# Patient Record
Sex: FEMALE | Race: OTHER | HISPANIC OR LATINO | Employment: FULL TIME | URBAN - METROPOLITAN AREA
[De-identification: names, ages, dates, MRNs, and addresses within clinical notes are randomized per-mention and may not be internally consistent; named-entity substitution may affect disease eponyms.]

---

## 2017-04-03 ENCOUNTER — OFFICE VISIT (OUTPATIENT)
Dept: MEDICAL GROUP | Facility: MEDICAL CENTER | Age: 32
End: 2017-04-03
Payer: COMMERCIAL

## 2017-04-03 VITALS
HEART RATE: 69 BPM | SYSTOLIC BLOOD PRESSURE: 110 MMHG | WEIGHT: 133.16 LBS | BODY MASS INDEX: 24.5 KG/M2 | HEIGHT: 62 IN | OXYGEN SATURATION: 95 % | TEMPERATURE: 98.2 F | RESPIRATION RATE: 14 BRPM | DIASTOLIC BLOOD PRESSURE: 76 MMHG

## 2017-04-03 DIAGNOSIS — Z76.0 MEDICATION REFILL: ICD-10-CM

## 2017-04-03 DIAGNOSIS — J45.20 MILD INTERMITTENT ASTHMA WITHOUT COMPLICATION: ICD-10-CM

## 2017-04-03 DIAGNOSIS — J45.901 ASTHMA EXACERBATION: ICD-10-CM

## 2017-04-03 PROCEDURE — 99214 OFFICE O/P EST MOD 30 MIN: CPT | Performed by: PHYSICIAN ASSISTANT

## 2017-04-03 RX ORDER — MONTELUKAST SODIUM 10 MG/1
10 TABLET ORAL DAILY
Qty: 30 TAB | Refills: 5 | Status: SHIPPED | OUTPATIENT
Start: 2017-04-03 | End: 2017-09-24 | Stop reason: SDUPTHER

## 2017-04-03 RX ORDER — ALBUTEROL SULFATE 90 UG/1
2 AEROSOL, METERED RESPIRATORY (INHALATION) EVERY 6 HOURS PRN
Qty: 8.5 G | Refills: 5 | Status: SHIPPED | OUTPATIENT
Start: 2017-04-03 | End: 2017-09-11 | Stop reason: SDUPTHER

## 2017-04-03 ASSESSMENT — PATIENT HEALTH QUESTIONNAIRE - PHQ9: CLINICAL INTERPRETATION OF PHQ2 SCORE: 0

## 2017-04-03 NOTE — ASSESSMENT & PLAN NOTE
Chronic in nature. Patient states flaring up this time of year. Patient makes that she is not using on a regular basis, but over the last few days has had to use it more periodically. Patient admits positive nasal congestion, will watery eyes slightly, slight sneezing. Positive dry cough on episodic occasion. No fever or chills. No nausea or vomiting.  Patient states no history of intubation. Patient states has not had to be hospitalized secondary to asthma.

## 2017-04-03 NOTE — PROGRESS NOTES
Chief Complaint   Patient presents with   • Medication Refill       HISTORY OF PRESENT ILLNESS: Patient is a 31 y.o. female established patient who presents today to talk about asthma    Uncomplicated asthma  Chronic in nature. Patient states flaring up this time of year. Patient makes that she is not using on a regular basis, but over the last few days has had to use it more periodically. Patient admits positive nasal congestion, will watery eyes slightly, slight sneezing. Positive dry cough on episodic occasion. No fever or chills. No nausea or vomiting.  Patient states no history of intubation. Patient states has not had to be hospitalized secondary to asthma.       Patient Active Problem List    Diagnosis Date Noted   • Stuffy nose 09/15/2016   • Ear discomfort 09/15/2016   • Lower urinary tract infectious disease 09/28/2015   • Uncomplicated asthma 09/21/2015       Allergies:Review of patient's allergies indicates no known allergies.    Current Outpatient Prescriptions   Medication Sig Dispense Refill   • albuterol 108 (90 BASE) MCG/ACT Aero Soln inhalation aerosol Inhale 2 Puffs by mouth every 6 hours as needed for Shortness of Breath. 8.5 g 5   • montelukast (SINGULAIR) 10 MG Tab Take 1 Tab by mouth every day. 30 Tab 5     No current facility-administered medications for this visit.       Social History   Substance Use Topics   • Smoking status: Never Smoker    • Smokeless tobacco: Never Used   • Alcohol Use: 0.0 oz/week     0 Standard drinks or equivalent per week      Comment: occasional       No family status information on file.   No family history on file.    Review of Systems:   Constitutional: Negative for fever, chills, weight loss and malaise/fatigue.   HENT: Negative for ear pain, nosebleeds.  positive nasal congestion.  Negative sore throat and neck pain.    Eyes: Negative for blurred vision.   Respiratory: Positive for cough.  Negative sputum production, shortness of breath and wheezing.   "  Cardiovascular: Negative for chest pain, palpitations, orthopnea and leg swelling.   Gastrointestinal: Negative for heartburn, nausea, vomiting and abdominal pain.   Genitourinary: Negative for dysuria, urgency and frequency.   Musculoskeletal: Negative for myalgias, back pain and joint pain.   Skin: Negative for rash and itching.   Neurological: Negative for dizziness, tingling, tremors, sensory change, focal weakness and headaches.   Endo/Heme/Allergies: Does not bruise/bleed easily.   Psychiatric/Behavioral: Negative for depression, suicidal ideas and memory loss.  The patient is not nervous/anxious and does not have insomnia.    All other systems reviewed and are negative except as in HPI.    Exam:  Blood pressure 110/76, pulse 69, temperature 36.8 °C (98.2 °F), resp. rate 14, height 1.575 m (5' 2.01\"), weight 60.4 kg (133 lb 2.5 oz), SpO2 95 %, not currently breastfeeding.  General:  Well nourished, well developed female in NAD  Head: is grossly normal.  HEENT: Eyes conjunctiva is clear, lids without ptosis, pupils equal round and reactive to light and accommodation.  Ears normal shape and contour, canals are clear bilaterally, TMs with good light reflex and appear normal.  Nasal mucosa hypertrophic, boggy, and edematous with + rhinorrhea.  Oropharynx + PND.  Sinuses (frontal and maxillary) nontender to palpation.   Neck: Supple without JVD or bruit. Thyroid is not enlarged.  Pulmonary: Clear to ausculation. Normal effort. No rales, ronchi, or wheezing.  Cardiovascular: Regular rate and rhythm without murmur. Carotid and radial pulses are intact and equal bilaterally.  Extremities: no clubbing, cyanosis, or edema.    Medical decision-making and discussion: A 31-year-old female presents to clinic today to discuss chronic condition of asthma. Discussed the differential diagnosis. At such time or going to refill her albuterol. I do believe patient has a slight allergic rhinitis component as well and we will be " adding into the component Singulair 10 mg daily basis. Discussed with patient that by taking singular daily basis may be able to use rescue inhaler less often as it may not be needed. Also discussed over-the-counter Flonase which will help with nasal congestion.    Please note that this dictation was created using voice recognition software. I have made every reasonable attempt to correct obvious errors, but I expect that there are errors of grammar and possibly content that I did not discover before finalizing the note.    Assessment/Plan:  1. Mild intermittent asthma without complication  albuterol 108 (90 BASE) MCG/ACT Aero Soln inhalation aerosol    montelukast (SINGULAIR) 10 MG Tab   2. Asthma exacerbation  albuterol 108 (90 BASE) MCG/ACT Aero Soln inhalation aerosol   3. Medication refill

## 2017-09-11 ENCOUNTER — OFFICE VISIT (OUTPATIENT)
Dept: MEDICAL GROUP | Facility: PHYSICIAN GROUP | Age: 32
End: 2017-09-11
Payer: COMMERCIAL

## 2017-09-11 VITALS
HEIGHT: 63 IN | BODY MASS INDEX: 23.39 KG/M2 | OXYGEN SATURATION: 96 % | HEART RATE: 71 BPM | WEIGHT: 132 LBS | SYSTOLIC BLOOD PRESSURE: 108 MMHG | TEMPERATURE: 97.5 F | DIASTOLIC BLOOD PRESSURE: 70 MMHG

## 2017-09-11 DIAGNOSIS — J45.20 MILD INTERMITTENT ASTHMA WITHOUT COMPLICATION: ICD-10-CM

## 2017-09-11 DIAGNOSIS — J45.901 ASTHMA EXACERBATION: ICD-10-CM

## 2017-09-11 PROCEDURE — 99214 OFFICE O/P EST MOD 30 MIN: CPT | Performed by: FAMILY MEDICINE

## 2017-09-11 RX ORDER — ALBUTEROL SULFATE 90 UG/1
2 AEROSOL, METERED RESPIRATORY (INHALATION) EVERY 4 HOURS PRN
Qty: 1 INHALER | Refills: 2 | Status: SHIPPED | OUTPATIENT
Start: 2017-09-11

## 2017-09-11 RX ORDER — PREDNISONE 20 MG/1
40 TABLET ORAL DAILY
Qty: 10 TAB | Refills: 0 | Status: SHIPPED | OUTPATIENT
Start: 2017-09-11 | End: 2017-09-16

## 2017-09-11 ASSESSMENT — PAIN SCALES - GENERAL: PAINLEVEL: 5=MODERATE PAIN

## 2017-09-11 NOTE — PROGRESS NOTES
"Subjective:   Yuliana Hernández is a 32 y.o. female here today for Asthma exacerbation    History of present illness:     Patient has an established diagnosis of intermittent asthma and has not had an exacerbation in several months. Since she was started on Singulair, she has done really well. However since the last 2 days she started developing symptoms including wheezing, shortness of breath, runny nose. 2 days back she first woke up with swollen eyelids and itchy eyes. She took some Benadryl, the eye symptoms improved quite a bit. No sick contacts but her dog gave birth to 6 puppies. She is allergic to animal dander. She denies fever, chills, diarrhea, body aches.  She has been having some cough with mild clear phlegm.    Current medicines (including changes today)  Current Outpatient Prescriptions   Medication Sig Dispense Refill   • predniSONE (DELTASONE) 20 MG Tab Take 2 Tabs by mouth every day for 5 days. 10 Tab 0   • albuterol 108 (90 Base) MCG/ACT Aero Soln inhalation aerosol Inhale 2 Puffs by mouth every four hours as needed for Shortness of Breath. 1 Inhaler 2   • montelukast (SINGULAIR) 10 MG Tab Take 1 Tab by mouth every day. 30 Tab 5     No current facility-administered medications for this visit.      She  has no past medical history on file.    ROS   No chest pain, no shortness of breath, no abdominal pain         Objective:     Blood pressure 108/70, pulse 71, temperature 36.4 °C (97.5 °F), height 1.6 m (5' 3\"), weight 59.9 kg (132 lb), SpO2 96 %. Body mass index is 23.38 kg/m².     PHYSICAL EXAM     GEN: Alert and oriented,well appearing, no acute distress  SKIN: warm, dry to touch, no rashes or lesions in visible areas  PSYCH: mood and affect normal, judgement normal  EYES: Conjunctiva clear, lids normal,pupils equal round and reactive  ENMT: Normal external nose and ears,EACs normal appearing, TM pearly gray with normal light reflex bilaterally,nasal mucosa and turbinates normal appearing with " congestion, lips without lesions,good dentition,oropharynx clear  Neck : Trachea midline, no masses or swelling, no thyromegaly  LYMPHATIC : No cervical or supraclavicular lymphadenopathy  RESPIRATORY : Unlabored respiratory effort, no distress noted, few scattered wheezes, no rhonchi  CARDIOVASCULAR: RRR, S1 S2 normal, no murmurs   MUSCULOSKELETAL : Normal gait and stance, no obvious abnormalities   NEURO: No overt focal neurologic deficits,sensation intact        Assessment and Plan:   The following treatment plan was discussed    1. Asthma exacerbation  New problem  Prescribed Prednisone for 5 days.  Albuterol inhaler prn for wheezing every 2-4 for 24 -48 hours  Take Benadryl PO daily for 10 days  Return to clinic if symptoms worsen  - predniSONE (DELTASONE) 20 MG Tab; Take 2 Tabs by mouth every day for 5 days.  Dispense: 10 Tab; Refill: 0  - albuterol 108 (90 Base) MCG/ACT Aero Soln inhalation aerosol; Inhale 2 Puffs by mouth every four hours as needed for Shortness of Breath.  Dispense: 1 Inhaler; Refill: 2    2. Mild intermittent asthma without complication  New problem to me  - albuterol 108 (90 Base) MCG/ACT Aero Soln inhalation aerosol; Inhale 2 Puffs by mouth every four hours as needed for Shortness of Breath.  Dispense: 1 Inhaler; Refill: 2      Followup: Return if symptoms worsen or fail to improve.         Please note that this dictation was created using voice recognition software. I have made every reasonable attempt to correct obvious errors, but I expect that there are errors of grammar and possibly content that I did not discover before finalizing the note.

## 2017-09-19 ENCOUNTER — APPOINTMENT (OUTPATIENT)
Dept: RADIOLOGY | Facility: IMAGING CENTER | Age: 32
End: 2017-09-19
Attending: PHYSICIAN ASSISTANT
Payer: COMMERCIAL

## 2017-09-19 ENCOUNTER — OFFICE VISIT (OUTPATIENT)
Dept: URGENT CARE | Facility: PHYSICIAN GROUP | Age: 32
End: 2017-09-19
Payer: COMMERCIAL

## 2017-09-19 VITALS
SYSTOLIC BLOOD PRESSURE: 92 MMHG | DIASTOLIC BLOOD PRESSURE: 68 MMHG | HEART RATE: 101 BPM | BODY MASS INDEX: 23.21 KG/M2 | WEIGHT: 131 LBS | HEIGHT: 63 IN | OXYGEN SATURATION: 94 % | RESPIRATION RATE: 20 BRPM | TEMPERATURE: 98.3 F

## 2017-09-19 DIAGNOSIS — R05.9 COUGH: ICD-10-CM

## 2017-09-19 DIAGNOSIS — J20.9 ACUTE BRONCHITIS, UNSPECIFIED ORGANISM: ICD-10-CM

## 2017-09-19 PROCEDURE — 99214 OFFICE O/P EST MOD 30 MIN: CPT | Performed by: PHYSICIAN ASSISTANT

## 2017-09-19 PROCEDURE — 94640 AIRWAY INHALATION TREATMENT: CPT | Performed by: PHYSICIAN ASSISTANT

## 2017-09-19 PROCEDURE — 71020 DX-CHEST-2 VIEWS: CPT | Mod: TC | Performed by: PHYSICIAN ASSISTANT

## 2017-09-19 RX ORDER — DOXYCYCLINE HYCLATE 100 MG
100 TABLET ORAL 2 TIMES DAILY
Qty: 20 TAB | Refills: 0 | Status: ON HOLD | OUTPATIENT
Start: 2017-09-19 | End: 2020-03-26

## 2017-09-19 RX ORDER — ALBUTEROL SULFATE 2.5 MG/3ML
2.5 SOLUTION RESPIRATORY (INHALATION) ONCE
Status: COMPLETED | OUTPATIENT
Start: 2017-09-19 | End: 2017-09-19

## 2017-09-19 RX ORDER — METHYLPREDNISOLONE 4 MG/1
TABLET ORAL
Qty: 21 TAB | Refills: 0 | Status: ON HOLD | OUTPATIENT
Start: 2017-09-19 | End: 2020-03-26

## 2017-09-19 RX ADMIN — ALBUTEROL SULFATE 2.5 MG: 2.5 SOLUTION RESPIRATORY (INHALATION) at 17:45

## 2017-09-19 ASSESSMENT — PAIN SCALES - GENERAL: PAINLEVEL: 6=MODERATE PAIN

## 2017-09-19 NOTE — PROGRESS NOTES
Chief Complaint   Patient presents with   • Cough     x1 week. Cough, loss of voice, ear pain, sore throat, headache.       HISTORY OF PRESENT ILLNESS: Patient is a 32 y.o. female who presents today for 1 week of feeling unwell.   She feels that overall her symptoms are changing and not getting better.  She states her nose is a little bit better as far as congestion.  She feels like her left ear is consistently hurting and is popping.  Her throat is hurting really badly as well. She is not sure if she has had any fevers.  Last few nights she has felt hot and cold.  Her cough has been productive of mucus.     She has also been using her rescue inhaler more this week as well.  She did see her PCP earlier in the week and was put on 5 days of Prednisone however feels she has gotten worse overall    Patient Active Problem List    Diagnosis Date Noted   • Asthma exacerbation 09/11/2017   • Stuffy nose 09/15/2016   • Ear discomfort 09/15/2016   • Lower urinary tract infectious disease 09/28/2015   • Uncomplicated asthma 09/21/2015       Allergies:Review of patient's allergies indicates no known allergies.    Current Outpatient Prescriptions Ordered in [facility]   Medication Sig Dispense Refill   • albuterol 108 (90 Base) MCG/ACT Aero Soln inhalation aerosol Inhale 2 Puffs by mouth every four hours as needed for Shortness of Breath. 1 Inhaler 2   • montelukast (SINGULAIR) 10 MG Tab Take 1 Tab by mouth every day. 30 Tab 5     No current Epic-ordered facility-administered medications on file.        No past medical history on file.    Social History   Substance Use Topics   • Smoking status: Never Smoker   • Smokeless tobacco: Never Used   • Alcohol use 0.0 oz/week      Comment: occasional       No family status information on file.   History reviewed. No pertinent family history.    ROS:  Review of Systems   Constitutional: SEE HPI  HENT: CRISTY HPI  Eyes: Negative for blurred vision.   Respiratory: SEE HPI   Cardiovascular:  "Negative for chest pain, palpitations, orthopnea and leg swelling.   Gastrointestinal: Negative for heartburn, nausea, vomiting and abdominal pain.   All other systems reviewed and are negative.       Exam:  Blood pressure (!) 92/68, pulse (!) 101, temperature 36.8 °C (98.3 °F), resp. rate 20, height 1.6 m (5' 3\"), weight 59.4 kg (131 lb), last menstrual period 09/12/2017, SpO2 94 %, not currently breastfeeding.  General:  Well nourished, well developed female in NAD  Eyes: PERRLA, EOM within normal limits, no conjunctival injection, no scleral icterus, visual fields and acuity grossly intact.  Ears: Normal shape and symmetry, no tenderness, no discharge. External canals are without any significant edema or erythema. Tympanic membranes are without any inflammation, no effusion. Gross auditory acuity is intact  Nose: Symmetrical, sinuses without tenderness, no discharge.   Mouth: reasonable hygiene, no erythema exudates or tonsillar enlargement.  Neck: no masses, range of motion within normal limits, no tracheal deviation. No lymphadenopathy  Pulmonary: Normal respiratory effort, diffuse scattered expiratory wheezes without rales or rhonchi.   Cardiovascular: regular rate and rhythm without murmurs, rubs, or gallops.  Skin: No visible rashes or lesion. Warm, pink, dry.   Extremities: no clubbing, cyanosis, or edema.  Neuro: A&O x 3. Speech normal/clear.  Normal gait.     RAD    Impression       No acute cardiopulmonary process is identified.   Reading Provider Reading Date   Feng Colon M.D. Sep 19, 2017       Assessment/Plan:  1. Acute bronchitis, unspecified organism  doxycycline (VIBRAMYCIN) 100 MG Tab    MethylPREDNISolone (MEDROL DOSEPAK) 4 MG Tablet Therapy Pack   2. Cough  DX-CHEST-2 VIEWS    albuterol (PROVENTIL) 2.5mg/3ml nebulizer solution 2.5 mg         -RAD negative as above.   -albuterol neb helped symptoms and wheezing.   -tx coverage as above, patient worsening/tactile fevers at day 7-8  -work " note provided  -humidifier/steamy showers recommended for lung soothing.  Rest and fluids emphasized.     Supportive care, differential diagnoses, and indications for immediate follow-up discussed with patient.   Pathogenesis of diagnosis discussed including typical length and natural progression.   Instructed to return to clinic or nearest emergency department for any change in condition, further concerns, or worsening of symptoms.  Patient states understanding of the plan of care and discharge instructions.  Instructed to make an appointment, for follow up, with their primary care provider.      Jenny Teague P.A.-C.

## 2017-09-19 NOTE — LETTER
September 19, 2017         Patient: Yuliana Hernández   YOB: 1985   Date of Visit: 9/19/2017           To Whom it May Concern:    Yuliana Hernández was seen in my clinic on 9/19/2017. She is to be off tomorrow to recover please.     If you have any questions or concerns, please don't hesitate to call.        Sincerely,           Jenny Teague P.A.-C.  Electronically Signed

## 2017-09-24 DIAGNOSIS — J45.20 MILD INTERMITTENT ASTHMA WITHOUT COMPLICATION: ICD-10-CM

## 2017-09-25 NOTE — TELEPHONE ENCOUNTER
Was the patient seen in the last year in this department? Yes     Does patient have an active prescription for medications requested? Yes     Received Request Via: Pharmacy     Last office visit: 09/11/2017  Last labs: 06/14/2017

## 2017-09-26 RX ORDER — MONTELUKAST SODIUM 10 MG/1
TABLET ORAL
Qty: 90 TAB | Refills: 1 | Status: SHIPPED | OUTPATIENT
Start: 2017-09-26 | End: 2019-05-03

## 2018-03-15 ENCOUNTER — OFFICE VISIT (OUTPATIENT)
Dept: URGENT CARE | Facility: PHYSICIAN GROUP | Age: 33
End: 2018-03-15
Payer: COMMERCIAL

## 2018-03-15 VITALS
RESPIRATION RATE: 20 BRPM | DIASTOLIC BLOOD PRESSURE: 68 MMHG | TEMPERATURE: 98.2 F | BODY MASS INDEX: 23.39 KG/M2 | SYSTOLIC BLOOD PRESSURE: 100 MMHG | OXYGEN SATURATION: 96 % | HEIGHT: 63 IN | WEIGHT: 132 LBS | HEART RATE: 87 BPM

## 2018-03-15 DIAGNOSIS — J06.9 VIRAL URI: ICD-10-CM

## 2018-03-15 DIAGNOSIS — J45.901 MODERATE ASTHMA WITH EXACERBATION, UNSPECIFIED WHETHER PERSISTENT: Primary | ICD-10-CM

## 2018-03-15 PROCEDURE — 94640 AIRWAY INHALATION TREATMENT: CPT | Performed by: PHYSICIAN ASSISTANT

## 2018-03-15 PROCEDURE — 99214 OFFICE O/P EST MOD 30 MIN: CPT | Performed by: PHYSICIAN ASSISTANT

## 2018-03-15 PROCEDURE — 99999 PR NO CHARGE: CPT | Performed by: PHYSICIAN ASSISTANT

## 2018-03-15 PROCEDURE — 94760 N-INVAS EAR/PLS OXIMETRY 1: CPT | Performed by: PHYSICIAN ASSISTANT

## 2018-03-15 RX ORDER — PROMETHAZINE HYDROCHLORIDE AND CODEINE PHOSPHATE 6.25; 1 MG/5ML; MG/5ML
5 SYRUP ORAL
Qty: 120 ML | Refills: 0 | Status: SHIPPED | OUTPATIENT
Start: 2018-03-15 | End: 2018-03-22

## 2018-03-15 RX ORDER — METHYLPREDNISOLONE 4 MG/1
4 TABLET ORAL DAILY
Qty: 1 KIT | Refills: 0 | Status: ON HOLD | OUTPATIENT
Start: 2018-03-15 | End: 2020-03-26

## 2018-03-15 RX ORDER — IPRATROPIUM BROMIDE AND ALBUTEROL SULFATE 2.5; .5 MG/3ML; MG/3ML
3 SOLUTION RESPIRATORY (INHALATION) ONCE
Status: COMPLETED | OUTPATIENT
Start: 2018-03-15 | End: 2018-03-15

## 2018-03-15 RX ADMIN — IPRATROPIUM BROMIDE AND ALBUTEROL SULFATE 3 ML: 2.5; .5 SOLUTION RESPIRATORY (INHALATION) at 12:40

## 2018-03-15 ASSESSMENT — ENCOUNTER SYMPTOMS
COUGH: 1
CONSTITUTIONAL NEGATIVE: 1
EYES NEGATIVE: 1
SHORTNESS OF BREATH: 1
GASTROINTESTINAL NEGATIVE: 1
WHEEZING: 1

## 2018-03-15 NOTE — PROGRESS NOTES
Subjective:      Yuliana Hernández is a 32 y.o. female who presents with Cough (cough, chest congestion, difficulty breathing x4 days)            HPI  Chief Complaint   Patient presents with   • Cough     cough, chest congestion, difficulty breathing x4 days       HPI:  Yuliana Hernández is a 32 y.o. female who presents with cough and asthma exacerbation x 4 days.  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 Was noticing worsening chest congestion and tightness this morning. Has been using her albuterol inhaler most every hour. Also having runny nose and sinus congestion. No fever or chills. No thick or yellow colored mucous changes. Patient denies HA, chest pain, palpitations, fever, chills, or n/v/d.      History reviewed. No pertinent past medical history.    History reviewed. No pertinent surgical history.    History reviewed. No pertinent family history.  No pertinent family history.    Social History     Social History   • Marital status:      Spouse name: N/A   • Number of children: N/A   • Years of education: N/A     Occupational History   • Not on file.     Social History Main Topics   • Smoking status: Never Smoker   • Smokeless tobacco: Never Used   • Alcohol use 0.0 oz/week      Comment: occasional   • Drug use: No   • Sexual activity: Yes     Other Topics Concern   • Not on file     Social History Narrative   • No narrative on file         Current Outpatient Prescriptions:   •  montelukast, TAKE ONE TABLET BY MOUTH DAILY, 3/15/2018  •  albuterol, 2 Puff, Inhalation, Q4HRS PRN, 3/15/2018  •  doxycycline, 100 mg, Oral, BID  •  MethylPREDNISolone, As directed on the packaging label.    No Known Allergies      Review of Systems   Constitutional: Negative.    HENT: Positive for congestion.    Eyes: Negative.    Respiratory: Positive for cough, shortness of breath and wheezing.    Gastrointestinal: Negative.    Skin: Negative.    All other systems reviewed and are  "negative.         Objective:     /68   Pulse 87   Temp 36.8 °C (98.2 °F)   Resp 20   Ht 1.6 m (5' 3\")   Wt 59.9 kg (132 lb)   SpO2 94%   BMI 23.38 kg/m²      Physical Exam       Nursing note and vitals reviewed.    Constitutional:   Appropriately groomed, pleasant affect, well nourished, in NAD.    Head:   Normocephalic, atraumatic.    Eyes:   PERRLA, EOM's full, sclera white, conjunctiva not erythematous, and medial canthus without exudate bilaterally.    Ears:  Auricle and tragus non-tender to manipulation.  No pre-auricular lymphadenopathy or mastoid ttp.  EACs with mild cerumen bilaterally, not erythematous.  TM’s pearly gray with cone of light present and umbo and malleolus visible bilaterally.  No bulging or fluid bubbles present in middle ear.  Hearing grossly intact to voice.    Nose:  Nares not patent bilaterally.  Nasal mucosa edematous with white rhinorrhea bilaterally.  Mild sinus tenderness to percussion.    Throat:  Dentition wnl, mucosa moist without lesions.  Oropharynx mildly erythematous, with no enlargement of the palatine tonsils bilaterally with no exudates.    Post nasal drainage  present.  Soft palate rises symmetrically bilaterally and uvula midline.      Neck: Neck supple, with mild anterior lymphadenopathy that is soft and mobile to palpation. Thyroid non-palpable without tenderness or nodules. No supraclavicular lymphadenopathy.    Lungs:  Respiratory effort not labored without accessory muscle use.  Lungs with inspiratory wheezes to auscultation. No rales. Rhonchi cleared with cough.    Heart:  RRR, without murmurs rubs or gallops.  Radial and dorsalis pedis pulse 2+ bilaterally.  No LE edema.    Musculoskeletal:  Gait non-antalgic with a narrow base.    Derm:  Skin without rashes or lesions with good turgor pressure.      Psychiatric:  Mood, affect, and judgement appropriate.       Assessment/Plan:     1. Moderate asthma with exacerbation, unspecified whether " persistent  Patient presents with 4 days of a viral URI causing asthma exacerbation. On exam patient has an oxygen sat of 94% room air. Lungs with inspiratory wheezes to auscultation. Post DuoNeb patient's oxygen saturation improved to 96% room air, adequate. Lungs sounds greatly improved with resolution of wheezes. Plan to treat with Medrol Dosepak and advise continue with albuterol inhaler. Patient does not require refill at this time. Cough medicine for bedtime use only. Work note provided.    Narcotic use report obtained with no indication of narcotic overuse or misuse and deemed necessary for treatment. Sedation, dependence, and constipation precautions given. Avoid use while driving or operating heavy machinery.      - ipratropium-albuterol (DUONEB) nebulizer solution 3 mL; 3 mL by Nebulization route Once.  - MethylPREDNISolone (MEDROL DOSEPAK) 4 MG Tablet Therapy Pack; Take 1 Tab by mouth every day. Take according to box instructions  Dispense: 1 Kit; Refill: 0  - promethazine-codeine (PHENERGAN-CODEINE) 6.25-10 MG/5ML Syrup; Take 5 mL by mouth at bedtime as needed for Cough for up to 7 days.  Dispense: 120 mL; Refill: 0    2. Viral URI  Recommended symptom support measures. Suspect viral URI at this time antibiotics are indicated. Reviewed signs and symptoms of bacterial infection and when to return to clinic.    Patient was in agreement with this treatment plan and seemed to understand without barriers. All questions were encouraged and answered.  Reviewed signs and symptoms of when to seek emergency medical care.     Please note that this dictation was created using voice recognition software.  I have made every reasonable attempt to correct obvious errors, but I expect there are errors of ihsan and possibly content that I did not discover before finalizing the note.

## 2018-03-15 NOTE — LETTER
March 15, 2018         Patient: Yuliana Hernández   YOB: 1985   Date of Visit: 3/15/2018           To Whom it May Concern:    Yuliana Hernández was seen in my clinic on 3/15/2018. Please excuse from work 3/15-3/16.    If you have any questions or concerns, please don't hesitate to call.        Sincerely,           Pablito Alcantar P.A.-C.  Electronically Signed

## 2019-05-03 ENCOUNTER — OFFICE VISIT (OUTPATIENT)
Dept: URGENT CARE | Facility: PHYSICIAN GROUP | Age: 34
End: 2019-05-03
Payer: COMMERCIAL

## 2019-05-03 VITALS
WEIGHT: 128 LBS | RESPIRATION RATE: 12 BRPM | HEIGHT: 63 IN | DIASTOLIC BLOOD PRESSURE: 68 MMHG | SYSTOLIC BLOOD PRESSURE: 98 MMHG | BODY MASS INDEX: 22.68 KG/M2 | TEMPERATURE: 98.1 F | OXYGEN SATURATION: 97 % | HEART RATE: 86 BPM

## 2019-05-03 DIAGNOSIS — J30.2 SEASONAL ALLERGIC RHINITIS, UNSPECIFIED TRIGGER: ICD-10-CM

## 2019-05-03 DIAGNOSIS — J45.21 MILD INTERMITTENT ASTHMA WITH ACUTE EXACERBATION: ICD-10-CM

## 2019-05-03 PROCEDURE — 99214 OFFICE O/P EST MOD 30 MIN: CPT | Performed by: FAMILY MEDICINE

## 2019-05-03 RX ORDER — ALBUTEROL SULFATE 90 UG/1
2 AEROSOL, METERED RESPIRATORY (INHALATION) EVERY 6 HOURS PRN
Qty: 8.5 G | Refills: 0 | Status: SHIPPED | OUTPATIENT
Start: 2019-05-03

## 2019-05-03 RX ORDER — MONTELUKAST SODIUM 10 MG/1
10 TABLET ORAL DAILY
Qty: 30 TAB | Refills: 2 | Status: ON HOLD
Start: 2019-05-03 | End: 2020-03-26

## 2019-05-03 RX ORDER — TRIAMCINOLONE ACETONIDE 55 UG/1
1 SPRAY, METERED NASAL DAILY
Qty: 17 G | Refills: 2 | Status: ON HOLD
Start: 2019-05-03 | End: 2020-03-26

## 2019-05-03 RX ORDER — AZITHROMYCIN 250 MG/1
TABLET, FILM COATED ORAL
Qty: 6 TAB | Refills: 0 | Status: ON HOLD | OUTPATIENT
Start: 2019-05-03 | End: 2020-03-26

## 2019-05-03 RX ORDER — CETIRIZINE HYDROCHLORIDE 10 MG/1
10 TABLET ORAL DAILY
Qty: 30 TAB | Refills: 2 | Status: ON HOLD
Start: 2019-05-03 | End: 2020-03-26

## 2019-05-03 ASSESSMENT — ENCOUNTER SYMPTOMS
HEADACHES: 1
SINUS PRESSURE: 0
CHILLS: 0

## 2019-05-03 NOTE — PROGRESS NOTES
"Subjective:   Yuliana Ridley a 33 y.o. female who presents for Sinus Problem (sinus congestion needs refill on albuterol and needs rx for singular )    Sinus Problem   This is a new problem. The current episode started in the past 7 days. The problem has been rapidly worsening since onset. The pain is moderate. Associated symptoms include congestion and headaches. Pertinent negatives include no chills, sinus pressure or sneezing.     Review of Systems   Constitutional: Negative for chills.   HENT: Positive for congestion. Negative for sinus pressure and sneezing.    Neurological: Positive for headaches.     No Known Allergies   Objective:   BP (!) 98/68 (BP Location: Right arm, Patient Position: Sitting, BP Cuff Size: Adult)   Pulse 86   Temp 36.7 °C (98.1 °F) (Temporal)   Resp 12   Ht 1.6 m (5' 3\")   Wt 58.1 kg (128 lb)   LMP 04/12/2019 (Within Days)   SpO2 97%   BMI 22.67 kg/m²   Physical Exam   Constitutional: She is oriented to person, place, and time. She appears well-developed and well-nourished. No distress.   HENT:   Head: Normocephalic and atraumatic.   Nose: Mucosal edema and rhinorrhea present. No sinus tenderness. Right sinus exhibits no maxillary sinus tenderness and no frontal sinus tenderness. Left sinus exhibits no maxillary sinus tenderness and no frontal sinus tenderness.   Eyes: Pupils are equal, round, and reactive to light. Conjunctivae and EOM are normal.   Cardiovascular: Normal rate and regular rhythm.    No murmur heard.  Pulmonary/Chest: Effort normal. No respiratory distress. She has wheezes. She has no rales.   Abdominal: Soft. She exhibits no distension. There is no tenderness.   Neurological: She is alert and oriented to person, place, and time. She has normal reflexes. No sensory deficit.   Skin: Skin is warm and dry.   Psychiatric: She has a normal mood and affect.     Assessment/Plan:   Assessment    1. Seasonal allergic rhinitis, unspecified trigger  - montelukast " (SINGULAIR) 10 MG Tab; Take 1 Tab by mouth every day.  Dispense: 30 Tab; Refill: 2  - triamcinolone (NASACORT) 55 MCG/ACT nasal inhaler; Spray 1 Spray in nose every day.  Dispense: 17 g; Refill: 2  - cetirizine (ZYRTEC) 10 MG Tab; Take 1 Tab by mouth every day.  Dispense: 30 Tab; Refill: 2    2. Mild intermittent asthma with acute exacerbation  - albuterol 108 (90 Base) MCG/ACT Aero Soln inhalation aerosol; Inhale 2 Puffs by mouth every 6 hours as needed for Shortness of Breath.  Dispense: 8.5 g; Refill: 0  - azithromycin (ZITHROMAX) 250 MG Tab; Take 2 tablets by mouth on day one. Take one tablet by mouth the remaining days until gone  Dispense: 6 Tab; Refill: 0      Differential diagnosis, natural history, supportive care, and indications for immediate follow-up discussed.  Return if symptoms worsen or fail to improve.

## 2019-05-03 NOTE — PATIENT INSTRUCTIONS
Allergic Rhinitis  Allergic rhinitis is when the mucous membranes in the nose respond to allergens. Allergens are particles in the air that cause your body to have an allergic reaction. This causes you to release allergic antibodies. Through a chain of events, these eventually cause you to release histamine into the blood stream. Although meant to protect the body, it is this release of histamine that causes your discomfort, such as frequent sneezing, congestion, and an itchy, runny nose.  What are the causes?  Seasonal allergic rhinitis (hay fever) is caused by pollen allergens that may come from grasses, trees, and weeds. Year-round allergic rhinitis (perennial allergic rhinitis) is caused by allergens such as house dust mites, pet dander, and mold spores.  What are the signs or symptoms?  · Nasal stuffiness (congestion).  · Itchy, runny nose with sneezing and tearing of the eyes.  How is this diagnosed?  Your health care provider can help you determine the allergen or allergens that trigger your symptoms. If you and your health care provider are unable to determine the allergen, skin or blood testing may be used. Your health care provider will diagnose your condition after taking your health history and performing a physical exam. Your health care provider may assess you for other related conditions, such as asthma, pink eye, or an ear infection.  How is this treated?  Allergic rhinitis does not have a cure, but it can be controlled by:  · Medicines that block allergy symptoms. These may include allergy shots, nasal sprays, and oral antihistamines.  · Avoiding the allergen.  Hay fever may often be treated with antihistamines in pill or nasal spray forms. Antihistamines block the effects of histamine. There are over-the-counter medicines that may help with nasal congestion and swelling around the eyes. Check with your health care provider before taking or giving this medicine.  If avoiding the allergen or the  medicine prescribed do not work, there are many new medicines your health care provider can prescribe. Stronger medicine may be used if initial measures are ineffective. Desensitizing injections can be used if medicine and avoidance does not work. Desensitization is when a patient is given ongoing shots until the body becomes less sensitive to the allergen. Make sure you follow up with your health care provider if problems continue.  Follow these instructions at home:  It is not possible to completely avoid allergens, but you can reduce your symptoms by taking steps to limit your exposure to them. It helps to know exactly what you are allergic to so that you can avoid your specific triggers.  Contact a health care provider if:  · You have a fever.  · You develop a cough that does not stop easily (persistent).  · You have shortness of breath.  · You start wheezing.  · Symptoms interfere with normal daily activities.  This information is not intended to replace advice given to you by your health care provider. Make sure you discuss any questions you have with your health care provider.  Document Released: 09/12/2002 Document Revised: 08/18/2017 Document Reviewed: 08/25/2014  Elsevier Interactive Patient Education © 2017 [employer]vier Inc[employer]

## 2019-12-02 LAB
HBV SURFACE AG SERPL QL IA: NORMAL
RUBV IGG SERPL IA-ACNC: NORMAL
TREPONEMA PALLIDUM IGG+IGM AB [PRESENCE] IN SERUM OR PLASMA BY IMMUNOASSAY: NORMAL

## 2020-03-17 ENCOUNTER — APPOINTMENT (OUTPATIENT)
Dept: RADIOLOGY | Facility: MEDICAL CENTER | Age: 35
End: 2020-03-17
Attending: OBSTETRICS & GYNECOLOGY
Payer: COMMERCIAL

## 2020-03-17 ENCOUNTER — HOSPITAL ENCOUNTER (OUTPATIENT)
Facility: MEDICAL CENTER | Age: 35
End: 2020-03-17
Attending: OBSTETRICS & GYNECOLOGY | Admitting: OBSTETRICS & GYNECOLOGY
Payer: COMMERCIAL

## 2020-03-17 VITALS
BODY MASS INDEX: 26.58 KG/M2 | RESPIRATION RATE: 12 BRPM | DIASTOLIC BLOOD PRESSURE: 67 MMHG | SYSTOLIC BLOOD PRESSURE: 112 MMHG | HEART RATE: 89 BPM | WEIGHT: 150 LBS | OXYGEN SATURATION: 98 % | HEIGHT: 63 IN | TEMPERATURE: 98 F

## 2020-03-17 LAB — CRYSTALS AMN MICRO: NORMAL

## 2020-03-17 PROCEDURE — 59025 FETAL NON-STRESS TEST: CPT

## 2020-03-17 PROCEDURE — 76816 OB US FOLLOW-UP PER FETUS: CPT

## 2020-03-17 PROCEDURE — 89060 EXAM SYNOVIAL FLUID CRYSTALS: CPT

## 2020-03-18 NOTE — PROGRESS NOTES
MARGUERITE 2020  GA 32w6d SIUP    Pt arrived to L&D c/o SROM around 1500 today. She had to change her underwear a few times. Clear fluid. Reports intermittent UCs more so when walking. Denies VB and reports +FM. pregnancy is complicated by placenta previa   TOCO/EFM applied, sterile speculum done, no pooling noted.  NILAM sent to lab     -order by Dr. Morrissey for OB US. No previa, baby vertex. SORAYA WNL   - Dr. Morrissey notified of US results. We come to bedside. SVE Closed. Per Dr. Morrissey ok to discharge home. Pre-term labor precautions discussed. Written understanding obtained. Pt and FOB ambulatory off unit in stable condition.

## 2020-03-18 NOTE — H&P
"OBSERVATION HISTORY AND PHYSICAL    IDENTIFICATION:  This is a 34-year-old  3, para 2-0-0-2, with an EGA of   32 and 6/7 weeks who presents with the chief complaint of vaginal discharge.    \"I think I broke my water.\"    HISTORY OF PRESENT ILLNESS:  This is a patient of mine who has gotten good   prenatal care.  Prenatal care has been uncomplicated.  She was seen in the   office today for normal OB care, went home and then at approximately 3 p.m.,   she had vaginal discharge; thought maybe she broke her water.  She   subsequently came here to labor and delivery for evaluation.  Here in labor   and delivery, fetal heart tracings reactive, category 1.  Fern is negative.    Her ultrasound shows a vertex presentation, size appropriate for dates, no   previa and adequate amniotic fluid.  Urinalysis is also normal.  She has no   other complaints of nausea, vomiting, fever, chills, change in bowel or   bladder habits.  Admits to good fetal movement.  Denies symptoms of PIH,   headaches, visual changes or epigastric pain.  She does have followup visit   scheduled in the office.    OBSTETRIC HISTORY:  Significant for 2 previous vaginal deliveries.    GYNECOLOGIC HISTORY:  History of abnormal Pap.  Most recent Pap is normal.    MEDICAL HISTORY:  ALLERGIES:  None.    CURRENT MEDICATIONS:  Prenatal vitamins.    MEDICAL PROBLEMS:  Asthma.    SURGICAL HISTORY:  None.    SOCIAL HISTORY:  Denies alcohol, tobacco or drug use.    FAMILY HISTORY:  Noncontributory.    REVIEW OF SYSTEMS:  Review of systems x12 is negative per AMA standards   available in chart.    LABORATORY DATA:  Normal.      PHYSICAL EXAMINATION:  ABDOMEN:  Soft, gravid.  EXTREMITIES:  Negative.    ASSESSMENT:  At this time:  1.  Pregnancy at 32 and 6/7 weeks.  2.  Spontaneous rupture of membranes ruled out.    PLAN:  At this time:  1.  Discharge to home.  2.  Follow up as planned.  3.  Kick counts,  labor precautions, PPROM precautions given.       " ____________________________________     MD CORONA Johnson / DAYNA    DD:  03/17/2020 21:59:57  DT:  03/17/2020 23:11:43    D#:  [id number]  Job#:  [id number]

## 2020-03-26 ENCOUNTER — HOSPITAL ENCOUNTER (INPATIENT)
Facility: MEDICAL CENTER | Age: 35
LOS: 18 days | End: 2020-04-14
Attending: OBSTETRICS & GYNECOLOGY | Admitting: OBSTETRICS & GYNECOLOGY
Payer: COMMERCIAL

## 2020-03-26 DIAGNOSIS — Z98.891 S/P CESAREAN SECTION: ICD-10-CM

## 2020-03-26 LAB
ABO + RH BLD: NORMAL
ABO GROUP BLD: NORMAL
ALBUMIN SERPL BCP-MCNC: 3.3 G/DL (ref 3.2–4.9)
ALBUMIN/GLOB SERPL: 1.1 G/DL
ALP SERPL-CCNC: 112 U/L (ref 30–99)
ALT SERPL-CCNC: 14 U/L (ref 2–50)
ANION GAP SERPL CALC-SCNC: 11 MMOL/L (ref 7–16)
APTT PPP: 25.4 SEC (ref 24.7–36)
AST SERPL-CCNC: 13 U/L (ref 12–45)
BILIRUB SERPL-MCNC: 0.2 MG/DL (ref 0.1–1.5)
BLD GP AB SCN SERPL QL: NORMAL
BUN SERPL-MCNC: 8 MG/DL (ref 8–22)
CALCIUM SERPL-MCNC: 8.1 MG/DL (ref 8.5–10.5)
CHLORIDE SERPL-SCNC: 106 MMOL/L (ref 96–112)
CO2 SERPL-SCNC: 19 MMOL/L (ref 20–33)
CREAT SERPL-MCNC: 0.62 MG/DL (ref 0.5–1.4)
ERYTHROCYTE [DISTWIDTH] IN BLOOD BY AUTOMATED COUNT: 48.2 FL (ref 35.9–50)
FIBRINOGEN PPP-MCNC: 505 MG/DL (ref 215–460)
GLOBULIN SER CALC-MCNC: 3.1 G/DL (ref 1.9–3.5)
GLUCOSE SERPL-MCNC: 92 MG/DL (ref 65–99)
HCT VFR BLD AUTO: 33.1 % (ref 37–47)
HGB BLD-MCNC: 10.9 G/DL (ref 12–16)
INR PPP: 0.92 (ref 0.87–1.13)
MAGNESIUM SERPL-MCNC: 4.3 MG/DL (ref 1.5–2.5)
MCH RBC QN AUTO: 29.8 PG (ref 27–33)
MCHC RBC AUTO-ENTMCNC: 32.9 G/DL (ref 33.6–35)
MCV RBC AUTO: 90.4 FL (ref 81.4–97.8)
PLATELET # BLD AUTO: 294 K/UL (ref 164–446)
PMV BLD AUTO: 10.2 FL (ref 9–12.9)
POTASSIUM SERPL-SCNC: 3.9 MMOL/L (ref 3.6–5.5)
PROT SERPL-MCNC: 6.4 G/DL (ref 6–8.2)
PROTHROMBIN TIME: 12.6 SEC (ref 12–14.6)
RBC # BLD AUTO: 3.66 M/UL (ref 4.2–5.4)
RH BLD: NORMAL
SODIUM SERPL-SCNC: 136 MMOL/L (ref 135–145)
WBC # BLD AUTO: 10.6 K/UL (ref 4.8–10.8)

## 2020-03-26 PROCEDURE — 36415 COLL VENOUS BLD VENIPUNCTURE: CPT

## 2020-03-26 PROCEDURE — 96366 THER/PROPH/DIAG IV INF ADDON: CPT

## 2020-03-26 PROCEDURE — 302128 INFUSION PUMP: Performed by: OBSTETRICS & GYNECOLOGY

## 2020-03-26 PROCEDURE — 85027 COMPLETE CBC AUTOMATED: CPT

## 2020-03-26 PROCEDURE — 80053 COMPREHEN METABOLIC PANEL: CPT

## 2020-03-26 PROCEDURE — 85610 PROTHROMBIN TIME: CPT

## 2020-03-26 PROCEDURE — 83735 ASSAY OF MAGNESIUM: CPT

## 2020-03-26 PROCEDURE — 85730 THROMBOPLASTIN TIME PARTIAL: CPT

## 2020-03-26 PROCEDURE — 86900 BLOOD TYPING SEROLOGIC ABO: CPT

## 2020-03-26 PROCEDURE — 86901 BLOOD TYPING SEROLOGIC RH(D): CPT

## 2020-03-26 PROCEDURE — 85384 FIBRINOGEN ACTIVITY: CPT

## 2020-03-26 PROCEDURE — 700105 HCHG RX REV CODE 258

## 2020-03-26 PROCEDURE — 96372 THER/PROPH/DIAG INJ SC/IM: CPT

## 2020-03-26 PROCEDURE — 96367 TX/PROPH/DG ADDL SEQ IV INF: CPT

## 2020-03-26 PROCEDURE — 700111 HCHG RX REV CODE 636 W/ 250 OVERRIDE (IP): Performed by: OBSTETRICS & GYNECOLOGY

## 2020-03-26 PROCEDURE — 87081 CULTURE SCREEN ONLY: CPT

## 2020-03-26 PROCEDURE — 87150 DNA/RNA AMPLIFIED PROBE: CPT

## 2020-03-26 PROCEDURE — 86850 RBC ANTIBODY SCREEN: CPT

## 2020-03-26 PROCEDURE — 96365 THER/PROPH/DIAG IV INF INIT: CPT

## 2020-03-26 RX ORDER — BETAMETHASONE SODIUM PHOSPHATE AND BETAMETHASONE ACETATE 3; 3 MG/ML; MG/ML
12 INJECTION, SUSPENSION INTRA-ARTICULAR; INTRALESIONAL; INTRAMUSCULAR; SOFT TISSUE EVERY 24 HOURS
Status: COMPLETED | OUTPATIENT
Start: 2020-03-26 | End: 2020-03-27

## 2020-03-26 RX ORDER — AMOXICILLIN 250 MG
1 CAPSULE ORAL 2 TIMES DAILY
Status: DISCONTINUED | OUTPATIENT
Start: 2020-03-26 | End: 2020-03-29

## 2020-03-26 RX ORDER — ONDANSETRON 2 MG/ML
4 INJECTION INTRAMUSCULAR; INTRAVENOUS EVERY 6 HOURS PRN
Status: DISCONTINUED | OUTPATIENT
Start: 2020-03-26 | End: 2020-04-10 | Stop reason: HOSPADM

## 2020-03-26 RX ORDER — MAGNESIUM SULFATE HEPTAHYDRATE 40 MG/ML
2 INJECTION, SOLUTION INTRAVENOUS CONTINUOUS
Status: DISCONTINUED | OUTPATIENT
Start: 2020-03-26 | End: 2020-03-28 | Stop reason: ALTCHOICE

## 2020-03-26 RX ORDER — SODIUM CHLORIDE, SODIUM LACTATE, POTASSIUM CHLORIDE, CALCIUM CHLORIDE 600; 310; 30; 20 MG/100ML; MG/100ML; MG/100ML; MG/100ML
INJECTION, SOLUTION INTRAVENOUS
Status: COMPLETED
Start: 2020-03-26 | End: 2020-03-26

## 2020-03-26 RX ORDER — MAGNESIUM SULFATE HEPTAHYDRATE 40 MG/ML
4 INJECTION, SOLUTION INTRAVENOUS ONCE
Status: COMPLETED | OUTPATIENT
Start: 2020-03-26 | End: 2020-03-26

## 2020-03-26 RX ORDER — DOCUSATE SODIUM 100 MG/1
100 CAPSULE, LIQUID FILLED ORAL 2 TIMES DAILY
Status: DISCONTINUED | OUTPATIENT
Start: 2020-03-26 | End: 2020-04-02

## 2020-03-26 RX ORDER — VITAMIN A ACETATE, BETA CAROTENE, ASCORBIC ACID, CHOLECALCIFEROL, .ALPHA.-TOCOPHEROL ACETATE, DL-, THIAMINE MONONITRATE, RIBOFLAVIN, NIACINAMIDE, PYRIDOXINE HYDROCHLORIDE, FOLIC ACID, CYANOCOBALAMIN, CALCIUM CARBONATE, FERROUS FUMARATE, ZINC OXIDE, CUPRIC OXIDE 3080; 12; 120; 400; 1; 1.84; 3; 20; 22; 920; 25; 200; 27; 10; 2 [IU]/1; UG/1; MG/1; [IU]/1; MG/1; MG/1; MG/1; MG/1; MG/1; [IU]/1; MG/1; MG/1; MG/1; MG/1; MG/1
1 TABLET, FILM COATED ORAL DAILY
Status: DISCONTINUED | OUTPATIENT
Start: 2020-03-27 | End: 2020-04-10

## 2020-03-26 RX ORDER — CALCIUM GLUCONATE 94 MG/ML
1 INJECTION, SOLUTION INTRAVENOUS
Status: DISCONTINUED | OUTPATIENT
Start: 2020-03-26 | End: 2020-04-10 | Stop reason: HOSPADM

## 2020-03-26 RX ADMIN — SODIUM CHLORIDE, POTASSIUM CHLORIDE, SODIUM LACTATE AND CALCIUM CHLORIDE 1000 ML: 600; 310; 30; 20 INJECTION, SOLUTION INTRAVENOUS at 20:51

## 2020-03-26 RX ADMIN — BETAMETHASONE SODIUM PHOSPHATE AND BETAMETHASONE ACETATE 12 MG: 3; 3 INJECTION, SUSPENSION INTRA-ARTICULAR; INTRALESIONAL; INTRAMUSCULAR at 21:02

## 2020-03-26 RX ADMIN — MAGNESIUM SULFATE IN WATER 4 G: 40 INJECTION, SOLUTION INTRAVENOUS at 20:55

## 2020-03-26 RX ADMIN — MAGNESIUM SULFATE IN WATER 2 G/HR: 40 INJECTION, SOLUTION INTRAVENOUS at 21:19

## 2020-03-26 SDOH — ECONOMIC STABILITY: FOOD INSECURITY: WITHIN THE PAST 12 MONTHS, YOU WORRIED THAT YOUR FOOD WOULD RUN OUT BEFORE YOU GOT MONEY TO BUY MORE.: NEVER TRUE

## 2020-03-26 SDOH — ECONOMIC STABILITY: TRANSPORTATION INSECURITY
IN THE PAST 12 MONTHS, HAS THE LACK OF TRANSPORTATION KEPT YOU FROM MEDICAL APPOINTMENTS OR FROM GETTING MEDICATIONS?: NO

## 2020-03-26 SDOH — ECONOMIC STABILITY: FOOD INSECURITY: WITHIN THE PAST 12 MONTHS, THE FOOD YOU BOUGHT JUST DIDN'T LAST AND YOU DIDN'T HAVE MONEY TO GET MORE.: NEVER TRUE

## 2020-03-26 SDOH — ECONOMIC STABILITY: TRANSPORTATION INSECURITY
IN THE PAST 12 MONTHS, HAS LACK OF TRANSPORTATION KEPT YOU FROM MEETINGS, WORK, OR FROM GETTING THINGS NEEDED FOR DAILY LIVING?: NO

## 2020-03-26 ASSESSMENT — LIFESTYLE VARIABLES
EVER FELT BAD OR GUILTY ABOUT YOUR DRINKING: NO
ALCOHOL_USE: NO
HAVE PEOPLE ANNOYED YOU BY CRITICIZING YOUR DRINKING: NO
TOTAL SCORE: 0
EVER HAD A DRINK FIRST THING IN THE MORNING TO STEADY YOUR NERVES TO GET RID OF A HANGOVER: NO
HOW MANY TIMES IN THE PAST YEAR HAVE YOU HAD 5 OR MORE DRINKS IN A DAY: 0
EVER_SMOKED: NEVER
ON A TYPICAL DAY WHEN YOU DRINK ALCOHOL HOW MANY DRINKS DO YOU HAVE: 0
TOTAL SCORE: 0
CONSUMPTION TOTAL: NEGATIVE
AVERAGE NUMBER OF DAYS PER WEEK YOU HAVE A DRINK CONTAINING ALCOHOL: 0
TOTAL SCORE: 0
HAVE YOU EVER FELT YOU SHOULD CUT DOWN ON YOUR DRINKING: NO

## 2020-03-26 ASSESSMENT — COPD QUESTIONNAIRES
IN THE PAST 12 MONTHS DO YOU DO LESS THAN YOU USED TO BECAUSE OF YOUR BREATHING PROBLEMS: DISAGREE/UNSURE
COPD SCREENING SCORE: 0
HAVE YOU SMOKED AT LEAST 100 CIGARETTES IN YOUR ENTIRE LIFE: NO/DON'T KNOW
DURING THE PAST 4 WEEKS HOW MUCH DID YOU FEEL SHORT OF BREATH: NONE/LITTLE OF THE TIME
DO YOU EVER COUGH UP ANY MUCUS OR PHLEGM?: NO/ONLY WITH OCCASIONAL COLDS OR INFECTIONS

## 2020-03-26 ASSESSMENT — PATIENT HEALTH QUESTIONNAIRE - PHQ9
SUM OF ALL RESPONSES TO PHQ9 QUESTIONS 1 AND 2: 0
1. LITTLE INTEREST OR PLEASURE IN DOING THINGS: NOT AT ALL
2. FEELING DOWN, DEPRESSED, IRRITABLE, OR HOPELESS: NOT AT ALL

## 2020-03-27 LAB
MAGNESIUM SERPL-MCNC: 5.3 MG/DL (ref 1.5–2.5)
MAGNESIUM SERPL-MCNC: 5.8 MG/DL (ref 1.5–2.5)
MAGNESIUM SERPL-MCNC: 5.8 MG/DL (ref 1.5–2.5)

## 2020-03-27 PROCEDURE — 96366 THER/PROPH/DIAG IV INF ADDON: CPT

## 2020-03-27 PROCEDURE — 36415 COLL VENOUS BLD VENIPUNCTURE: CPT

## 2020-03-27 PROCEDURE — 83735 ASSAY OF MAGNESIUM: CPT | Mod: 91

## 2020-03-27 PROCEDURE — 302790 HCHG STAT ANTEPARTUM CARE, DAILY

## 2020-03-27 PROCEDURE — 700105 HCHG RX REV CODE 258

## 2020-03-27 PROCEDURE — 700102 HCHG RX REV CODE 250 W/ 637 OVERRIDE(OP): Performed by: OBSTETRICS & GYNECOLOGY

## 2020-03-27 PROCEDURE — 700105 HCHG RX REV CODE 258: Performed by: OBSTETRICS & GYNECOLOGY

## 2020-03-27 PROCEDURE — A9270 NON-COVERED ITEM OR SERVICE: HCPCS | Performed by: OBSTETRICS & GYNECOLOGY

## 2020-03-27 PROCEDURE — 770002 HCHG ROOM/CARE - OB PRIVATE (112)

## 2020-03-27 PROCEDURE — 700111 HCHG RX REV CODE 636 W/ 250 OVERRIDE (IP): Performed by: OBSTETRICS & GYNECOLOGY

## 2020-03-27 PROCEDURE — 700112 HCHG RX REV CODE 229: Performed by: OBSTETRICS & GYNECOLOGY

## 2020-03-27 RX ORDER — SODIUM CHLORIDE, SODIUM LACTATE, POTASSIUM CHLORIDE, CALCIUM CHLORIDE 600; 310; 30; 20 MG/100ML; MG/100ML; MG/100ML; MG/100ML
INJECTION, SOLUTION INTRAVENOUS CONTINUOUS
Status: DISCONTINUED | OUTPATIENT
Start: 2020-03-27 | End: 2020-04-10 | Stop reason: HOSPADM

## 2020-03-27 RX ORDER — SODIUM CHLORIDE, SODIUM LACTATE, POTASSIUM CHLORIDE, CALCIUM CHLORIDE 600; 310; 30; 20 MG/100ML; MG/100ML; MG/100ML; MG/100ML
INJECTION, SOLUTION INTRAVENOUS
Status: COMPLETED
Start: 2020-03-27 | End: 2020-03-27

## 2020-03-27 RX ADMIN — DOCUSATE SODIUM 100 MG: 100 CAPSULE, LIQUID FILLED ORAL at 08:19

## 2020-03-27 RX ADMIN — SODIUM CHLORIDE, POTASSIUM CHLORIDE, SODIUM LACTATE AND CALCIUM CHLORIDE 1000 ML: 600; 310; 30; 20 INJECTION, SOLUTION INTRAVENOUS at 07:54

## 2020-03-27 RX ADMIN — BETAMETHASONE SODIUM PHOSPHATE AND BETAMETHASONE ACETATE 12 MG: 3; 3 INJECTION, SUSPENSION INTRA-ARTICULAR; INTRALESIONAL; INTRAMUSCULAR at 21:08

## 2020-03-27 RX ADMIN — DOCUSATE SODIUM 100 MG: 100 CAPSULE, LIQUID FILLED ORAL at 21:08

## 2020-03-27 RX ADMIN — VITAMIN A, VITAMIN C, VITAMIN D-3, VITAMIN E, VITAMIN B-1, VITAMIN B-2, NIACIN, VITAMIN B-6, CALCIUM, IRON, ZINC, COPPER 1 TABLET: 4000; 120; 400; 22; 1.84; 3; 20; 10; 1; 12; 200; 27; 25; 2 TABLET ORAL at 08:19

## 2020-03-27 RX ADMIN — MAGNESIUM SULFATE IN WATER 2 G/HR: 40 INJECTION, SOLUTION INTRAVENOUS at 16:17

## 2020-03-27 RX ADMIN — SODIUM CHLORIDE, POTASSIUM CHLORIDE, SODIUM LACTATE AND CALCIUM CHLORIDE: 600; 310; 30; 20 INJECTION, SOLUTION INTRAVENOUS at 20:29

## 2020-03-27 NOTE — PROGRESS NOTES
RN reported patient with frequent contractions and uncomfortable. Discussed with MFM Dr. Crook. No recommendations for additional tocolytics. Dr. Crook recommended low threshold for delivery. If moderate amount of bleeding occurs will take patient back for C/S. RN informed of plan of care and will have patient ready for C/S if contractions continue like this. Current plan is to attempt to get 48hr steroid window however given current contraction pattern it is unlikely that she will get her second dose of beta prior to C/S. Will continue to monitor. NPO status.     Amy Marcial M.D.

## 2020-03-27 NOTE — PROGRESS NOTES
0700- report received from [facility].  0800- VSS Pt reports scant stephanie bleeding from earlier this morning. No complaints of contraction pain at this time. Ambulates to BR with SBA. Pt states + Fetal movement, Magnesium running per MAR.  0945- Dr Ureña at , discussed POC with Pt.  1150 -  Lab called with critical result of mag at 5.8. Critical lab result read back.   This critical lab result is within parameters established by  for this patient

## 2020-03-27 NOTE — PROGRESS NOTES
2040-report received from RAMON Brice. Magnesium sulfate orders in   2055-4G loading dose started. Pulse ox applied, BPs every 5 minutes.   2102- 1st dose of betamethasone given. See MAR   2120- GBS collected   0100- call to Dr. Marcial. Update on pt contraction frequency. Pt reports them feeling the same, not much stronger. No new blood noted on pad. No new orders at this time. Keep magnesium at 2g/hr. Will cont to monitor   0230- Call to Dr. Marcial. Pt UCs getting significantly more painfull. No new bleeding noted on pad. Dr. Marcial consulted Dr. Crook. If no new bleeding ok to cont monitoring. IV pain medication offered. Pt declined at this time. Will cont to monitor .   0500- pt up to BR. Small amount of blood noted on pad and in toilet. Pt states UCs had mellowed out a bit and she was able to sleep. Call to Dr. Marcial. No new orders at this time   0640- Lab called with critical magnesieum sulfate of 5.3. result read back to lab. This value is WNL defined by Aniket Garcia.   0700- report given to ISRAEL Sargent RN

## 2020-03-27 NOTE — PROGRESS NOTES
"OB on call    S/ still feels her contractions but they are much less often, scant bleeding this am, baby moving well    O/  Vitals:    20 0602 20 0702 20 0902 20 1002   BP: 107/72 108/73 123/73 109/74   Pulse: (!) 102 96 99 (!) 107   Resp:       Temp:       TempSrc:       SpO2:       Weight:       Height:         gen-nad  Abd-soft, nt uterus  sve- not performed    EFM-cat 1  toco-q10    Mag at 2gm  Recent Labs     20   WBC 10.6   RBC 3.66*   HEMOGLOBIN 10.9*   HEMATOCRIT 33.1*   MCV 90.4   MCH 29.8   RDW 48.2   PLATELETCT 294   MPV 10.2     Per blood bank, has 2 units on hold    A&P/33yo  at 34w2d here with 3rd trim bleeding/ posterior partial previa  Bleeding stable at this time  D/w plan of care at this time would be to complete steroids and continue hospitalization to watch bleeding; in the event of further heavy bleeding and/or signs of fetal distress would need to deliver by c/section; I reviewed the surgery in detail with risks of further bleeding needing to have a transfusion and possible hysterectomy, infection, damage to internal organs, blood vessels and nerves; pt voiced understanding; she also desires to have her \"tubes tied\"; I reviewed full removal of FT and pt voiced understanding; she has signed consents in the office and now here on L&D.        "

## 2020-03-27 NOTE — PROGRESS NOTES
1810-Pt here from home with c/o bleeding. Pt placed on monitors (us and toco) pt reports +FM. Pt was having painful elizabeth earlier then about 1630 she had a gush of blood, followed by some clots. She states the bleeding has slowed down but still happening. Pt states contractions have also slowed down. Pt has history of pervia with this pregnancy. Dr Morrissey scanned her and told her it has moved but not all the way. She was seen at [facility] as a ob check on 3/17. We did an ultrasound. She has a posterior placenta but previa could not be ruled out due to fetal positioning. External exam performed, blood seen. POC dicussed with pt. Pt denies any questions   1817-Dr Marcial called, Report given. MD reviewing records and will come see patient   1845-Dr Marcial at bedside. Bedside vaginal US performed   1855-Sterile spec by Dr Marcial performed. Unable to visualize   1900-SVE by Dr Marcial SVE 1/thick/high. Report given to Deann MATT RN

## 2020-03-27 NOTE — PROGRESS NOTES
1900- Received report from SHEY Cruz RN. Assumed care of pt. Dr. Marcial at , U/S performed. POC discussed with pt. Dr. Marcial to contact Dr. Crook. Dr. Crook contacted, will be in to evaluate pt. Pt denies needs at this time. Encouraged to call with needs. Will monitor.

## 2020-03-27 NOTE — H&P
IDENTIFICATION:  A 34-year-old  3, para 2-0-0-2 at 34 weeks and 1 day   by ultrasound, [facility] 2020.    CHIEF COMPLAINT:  Vaginal bleeding.    HISTORY OF PRESENT ILLNESS:  The patient has prenatal care with Dr. Seth Sheffield.  She was diagnosed with a placenta previa during this pregnancy.  Her   last ultrasound was performed on , results of which showed a complete   placenta previa.  The patient was seen and evaluated here at [facility] on    for rule out spontaneous rupture of membranes.  She had no evidence of   spontaneous rupture of membranes and underwent repeat ultrasound.    Transabdominal ultrasound showed a posterior placenta with no placenta previa.    The patient states that she was going to talk to Dr. Sheffield this coming   Monday in regards to delivery plan.  States that today she started feeling   contractions and then had a moderate amount of vaginal bleeding.  Endorses   positive fetal movement.  Denies loss of fluid.    REVIEW OF SYSTEMS:  Denies fevers, chills, headaches, changes in vision,   shortness of breath, chest pain, nausea, vomiting, abdominal pain, dysuria or   diarrhea.    PAST MEDICAL HISTORY:  Asthma.    PAST SURGICAL HISTORY:  Denies.    MEDICATIONS:  Albuterol.    ALLERGIES:  No known drug allergies.    GYNECOLOGIC HISTORY:  Last menstrual period 2019.  Denies history of   sexually transmitted infections or genital herpes.    OBSTETRICAL HISTORY:  G1, , full term, normal spontaneous vaginal   delivery, 7 pounds.  G2, , full term, normal spontaneous vaginal delivery,   5 pounds.  G3, current prenatal care with Dr. Seth Sheffield, pregnancy   complicated by placenta previa.    SOCIAL HISTORY:  Denies tobacco use, alcohol use or drug use.    PHYSICAL EXAMINATION:  VITAL SIGNS:  Temperature 37.5 degrees Celsius, pulse 93, respiratory rate 16,   blood pressure 109/73, O2 sat 96% on room air.  GENERAL:  Alert, conversational, pleasant, no acute distress.  HEENT:   Moist mucous membranes.  CARDIOVASCULAR:  Regular rate.  RESPIRATORY:  No respiratory distress.  Symmetric expansion.  ABDOMEN:  Soft, nontender, nondistended, gravid.  GENITOURINARY:  Sterile speculum exam was performed and there was a small   amount of clotted blood.  Gentle sterile vaginal exam was performed and the   patient was found to be 1 cm thick effacement with high fetal station.  EXTREMITIES:  Moves all, no edema.    LABORATORY STUDIES:  Prenatal labs reviewed.  The patient is O positive,   antibody negative, hemoglobin 11.2, platelets 299.  Hepatitis B surface   antigen negative, HIV negative, RPR nonreactive, rubella immune, urine culture   negative.  Gonorrhea and chlamydia negative.  One-hour glucose challenge test   98.  AFP tetra negative.    IMAGING:  Anatomy ultrasound report reviewed.  Posterior placenta with   complete placenta previa, normal fetal anatomy, male.  Repeat ultrasound on    shows a posterior placenta previa, normal anatomy, growth 44th   percentile, 2 pounds 7 ounces.    ASSESSMENT AND PLAN:  A 34-year-old  3, para 2-0-0-2 at 34 weeks and 1   day by ultrasound, estimated date of confinement 2020.  1.   intrauterine pregnancy at 34 weeks and 1 day.  2.  Vaginal bleeding.  3.  Posterior placenta with partial previa.  4.  Fetal breech presentation.    DISCUSSION:  The patient was seen and evaluated in labor and delivery triage.    She was found to have moderate amount of vaginal bleeding and    contractions.  I performed a bedside transvaginal ultrasound, results of which   showed a placental clot versus succenturiate lobe in the vicinity of the   cervix.  I consulted [facility], Dr. Fernando Crook and he performed a transvaginal   ultrasound at bedside as well.  Imaging shows a succenturiate lobe lying in   close proximity to the internal cervical os.  There were feeding vessels from   the posterior placenta to this lobe.  There was also a clot appreciated in  the   internal os.  The patient was recommended inpatient monitoring with   betamethasone and magnesium for  labor.  Per MFM, Dr. Fernando Crook,   patient will require a  section for delivery if vaginal bleeding   continues, and he recommends delivery by 37 weeks if stable.    PLAN:  1.  Admit to L and D.  2.  Betamethasone 12.5 mg q. 24 hours x2 doses.  3.  Continuous fetal heart tracing/tocometer.  4.  Magnesium 6 g bolus followed by 2 g per hour.  5.  CBC, type and screen, coagulation studies.  4.  N.p.o. until further evaluation.  6.   mL per hour.       ____________________________________     MD MARY ELLEN Santos / DAYNA    DD:  2020 20:58:11  DT:  2020 22:18:56    D#:  [id number]  Job#:  [id number]

## 2020-03-27 NOTE — PROGRESS NOTES
Dr Crook at BS for U/S. Orders to admit pt received. 2 IV's started, labs drawn. Pt admitted. Report to RAMON Root RN

## 2020-03-27 NOTE — CONSULTS
DATE OF SERVICE:  2020    REQUESTING PHYSICIAN:  Amy Marcial MD    REASON FOR CONSULTATION:  Vaginal bleeding, rule out previa.    HISTORY OF PRESENT ILLNESS:  This is a 34-year-old , EDC 2020, now   34 weeks and 1 day, who presented with a sudden onset of vaginal bleeding.    Patient was seen by Dr. Marcial.  The patient was having regular contractions   and she contacted me to assist in management.    A transvaginal ultrasound was performed revealing small parts of the   presenting.  Cervix appears to be unremarkable; however, what was initially   thought to be a clot appears to be an accessory lobe (succenturiate) extending   up to and slightly beyond the internal os.  These findings were consistent   with a partial placenta previa.    These findings were reviewed with Dr. Marcial.    ASSESSMENT:  1.  Intrauterine pregnancy at 34 weeks and 1 day.  2.  Malpresentation.  3.  Posterior placenta with partial previa.  4.  Vaginal bleeding from previa.    RECOMMENDATIONS:  Recommend betamethasone, IV mag sulfate to stabilize the   patient and at least achieve 48 hours of steroids.  If patient remains stable,   recommend inpatient care as the patient does live a fair distance away from   the hospital.  I discussed with the patient of today's findings, reviewed the   images with her and discussed the rationale for the betamethasone as well as   the hospital stay.  Delivery to be at 37 weeks if she remains stable.    All questions answered to patient's satisfaction.  Case discussed with Dr. Marcial.    Time: 60 minutes       ____________________________________     MD MADHURI THOMPSON / DAYNA    DD:  2020 20:22:26  DT:  2020 20:52:50    D#:  [id number]  Job#:  [id number]

## 2020-03-28 LAB
GP B STREP DNA SPEC QL NAA+PROBE: NEGATIVE
MAGNESIUM SERPL-MCNC: 5.8 MG/DL (ref 1.5–2.5)

## 2020-03-28 PROCEDURE — A9270 NON-COVERED ITEM OR SERVICE: HCPCS | Performed by: OBSTETRICS & GYNECOLOGY

## 2020-03-28 PROCEDURE — 700102 HCHG RX REV CODE 250 W/ 637 OVERRIDE(OP): Performed by: OBSTETRICS & GYNECOLOGY

## 2020-03-28 PROCEDURE — 302790 HCHG STAT ANTEPARTUM CARE, DAILY

## 2020-03-28 PROCEDURE — 770002 HCHG ROOM/CARE - OB PRIVATE (112)

## 2020-03-28 PROCEDURE — 700112 HCHG RX REV CODE 229: Performed by: OBSTETRICS & GYNECOLOGY

## 2020-03-28 PROCEDURE — 700111 HCHG RX REV CODE 636 W/ 250 OVERRIDE (IP): Performed by: OBSTETRICS & GYNECOLOGY

## 2020-03-28 RX ADMIN — MAGNESIUM SULFATE IN WATER 2 G/HR: 40 INJECTION, SOLUTION INTRAVENOUS at 10:49

## 2020-03-28 RX ADMIN — DOCUSATE SODIUM 100 MG: 100 CAPSULE, LIQUID FILLED ORAL at 17:48

## 2020-03-28 RX ADMIN — VITAMIN A, VITAMIN C, VITAMIN D-3, VITAMIN E, VITAMIN B-1, VITAMIN B-2, NIACIN, VITAMIN B-6, CALCIUM, IRON, ZINC, COPPER 1 TABLET: 4000; 120; 400; 22; 1.84; 3; 20; 10; 1; 12; 200; 27; 25; 2 TABLET ORAL at 08:07

## 2020-03-28 ASSESSMENT — PATIENT HEALTH QUESTIONNAIRE - PHQ9
1. LITTLE INTEREST OR PLEASURE IN DOING THINGS: NOT AT ALL
SUM OF ALL RESPONSES TO PHQ9 QUESTIONS 1 AND 2: 0
2. FEELING DOWN, DEPRESSED, IRRITABLE, OR HOPELESS: NOT AT ALL

## 2020-03-28 NOTE — CARE PLAN
Problem: Risk for Infection, Impaired Wound Healing  Goal: Remain free from signs and symptoms of infection  Outcome: PROGRESSING AS EXPECTED  Note: Patient remains afebrile. No S&S of infections       Problem: Pain  Goal: Alleviation of Pain or a reduction in pain to the patient's comfort goal  Outcome: PROGRESSING AS EXPECTED  Note: Patient denies any pain or contractions. Patient educated to notify RN if any pain or contractions develop

## 2020-03-28 NOTE — PROGRESS NOTES
1900- report received from. PAVEL Velazquez RN   2108- second dose of betamethasone given. See MAR   2320- Lab called with critical result of Magnesium Sulfate at 5.8. Critical lab result read back to Lab.   This critical lab result is within parameters established by  for this patient. Further magnesium drawn canceled. Pt has been therapeutic   0700- report given to NIKA Palmer RN

## 2020-03-28 NOTE — PROGRESS NOTES
0700- report received from HORACIO Martinez RN.     0745- assessment done. Patient denies any contractions or leaking of fluid . Patient continues to have dark brown when she wipes. States positive fetal movement.     1900- report given to HORACIO Martinez Rn

## 2020-03-29 PROCEDURE — 59025 FETAL NON-STRESS TEST: CPT

## 2020-03-29 PROCEDURE — 302790 HCHG STAT ANTEPARTUM CARE, DAILY

## 2020-03-29 PROCEDURE — 770002 HCHG ROOM/CARE - OB PRIVATE (112)

## 2020-03-29 PROCEDURE — A9270 NON-COVERED ITEM OR SERVICE: HCPCS | Performed by: OBSTETRICS & GYNECOLOGY

## 2020-03-29 PROCEDURE — 700102 HCHG RX REV CODE 250 W/ 637 OVERRIDE(OP): Performed by: OBSTETRICS & GYNECOLOGY

## 2020-03-29 RX ADMIN — VITAMIN A, VITAMIN C, VITAMIN D-3, VITAMIN E, VITAMIN B-1, VITAMIN B-2, NIACIN, VITAMIN B-6, CALCIUM, IRON, ZINC, COPPER 1 TABLET: 4000; 120; 400; 22; 1.84; 3; 20; 10; 1; 12; 200; 27; 25; 2 TABLET ORAL at 08:21

## 2020-03-29 ASSESSMENT — PATIENT HEALTH QUESTIONNAIRE - PHQ9
2. FEELING DOWN, DEPRESSED, IRRITABLE, OR HOPELESS: NOT AT ALL
SUM OF ALL RESPONSES TO PHQ9 QUESTIONS 1 AND 2: 0
1. LITTLE INTEREST OR PLEASURE IN DOING THINGS: NOT AT ALL

## 2020-03-29 NOTE — CARE PLAN
Problem: Risk for Infection, Impaired Wound Healing  Goal: Remain free from signs and symptoms of infection  Outcome: PROGRESSING AS EXPECTED  Note: Pt remains free from signs/symptoms of infection.     Problem: Pain  Goal: Alleviation of Pain or a reduction in pain to the patient's comfort goal  Outcome: PROGRESSING AS EXPECTED  Note: Pt denies any pain, able to rest comfortably.

## 2020-03-29 NOTE — PROGRESS NOTES
0700- Received report from ISRAEL Martinez RN.  0815- Pt denies painful UC's, LOF, or VB, reports small amount of brown spotting.  Reports +FM.  1900- Report to TIM Browne RN.

## 2020-03-29 NOTE — PROGRESS NOTES
"Obstetrics and Gynecology  Labor and Delivery Antepartum Note    ID: 34 y.o. is a  with IUP at 34w3d      Primary Obstetrician: Seth Morrissey M.D.    Assessing Obstetrician: Carole Truong MD      S: Pt doing well. No contractions, bright red bleeding, or leaking of fluids. Baby moving well. Has brown spotting today only. Feels some chest heaviness and blurry vision which is mild due to her magnesium. Voiding. No N/V/D. No F/C.    ROS: 10 systems negative except as noted above.    O: /62   Pulse 87   Temp 36.6 °C (97.9 °F) (Temporal)   Resp 16   Ht 1.6 m (5' 3\")   Wt 68 kg (150 lb)   LMP 2019 (Within Days)   SpO2 95%   Breastfeeding No   BMI 26.57 kg/m²    Gen: NAD, AAO  HEENT: NC/AT, MMM  Neck: no visible masses  Pulm: no distress  Abd: Gravid, soft, uterus NTTP, no rebound/guarding  Ext: WWP, 2+ DPP, no edema  Skin: no rash  Neuro: no gross deficits, EOMI  Psy: mood good, affect normal and congruent  Pelvic: SVE deferred    NST: 125-130, pos accels, neg decels, moderate variability, reactive NST, cat 1 FHR  [place]: rare contractions    Recent Labs     20   WBC 10.6   RBC 3.66*   HEMOGLOBIN 10.9*   HEMATOCRIT 33.1*   MCV 90.4   MCH 29.8   RDW 48.2   PLATELETCT 294   MPV 10.2       Assessment:   Yuliana Hernández is a 34 y.o.  at 34w3d.  AVSS.  Reactive NST.  Posterior partial placenta previa  3rd trimester bleeding - currently stable on magnesium sulfuate  S/p BMZ x 2 - last dose around 2100 last night  Desires sterilization - for bilateral salpingectomy    Plan:  Continue antepartum management  Bed rest  Magnesium sulfate until 2100 tonight and then will discontinue and observe  If contractions/labor and vaginal bleeding return after d/c of mag patient will need delivery via primary   CFM/toco  Consent for primary  and bilateral salpingectomy in case of need for emergent delivery      Evaluation and clinical decision making including analysis of " fetal data, imaging and maternal lab work completed over a 25 minute period.          Carole Truong M.D., 3/28/2020, 7:31 PM

## 2020-03-29 NOTE — PROGRESS NOTES
1900- report received from NIKA Palmer RN. POC dicussed. Pt reports +FM, occasional UCs, and no additional bleeding. Per [facility], Magnesium sulfate to be turned off at 2100. Pt will stay a few additional days to monitor bleeding and UCs  2100- Magensium Sulfate D/Cd. Per Dr. Truong. Cont TOCO   0700- report given to SHEY Webster RN

## 2020-03-30 PROCEDURE — A9270 NON-COVERED ITEM OR SERVICE: HCPCS | Performed by: OBSTETRICS & GYNECOLOGY

## 2020-03-30 PROCEDURE — 302790 HCHG STAT ANTEPARTUM CARE, DAILY

## 2020-03-30 PROCEDURE — 59025 FETAL NON-STRESS TEST: CPT

## 2020-03-30 PROCEDURE — 700112 HCHG RX REV CODE 229: Performed by: OBSTETRICS & GYNECOLOGY

## 2020-03-30 PROCEDURE — 770002 HCHG ROOM/CARE - OB PRIVATE (112)

## 2020-03-30 PROCEDURE — 700102 HCHG RX REV CODE 250 W/ 637 OVERRIDE(OP): Performed by: OBSTETRICS & GYNECOLOGY

## 2020-03-30 RX ADMIN — VITAMIN A, VITAMIN C, VITAMIN D-3, VITAMIN E, VITAMIN B-1, VITAMIN B-2, NIACIN, VITAMIN B-6, CALCIUM, IRON, ZINC, COPPER 1 TABLET: 4000; 120; 400; 22; 1.84; 3; 20; 10; 1; 12; 200; 27; 25; 2 TABLET ORAL at 08:57

## 2020-03-30 RX ADMIN — DOCUSATE SODIUM 100 MG: 100 CAPSULE, LIQUID FILLED ORAL at 17:53

## 2020-03-30 ASSESSMENT — PATIENT HEALTH QUESTIONNAIRE - PHQ9
2. FEELING DOWN, DEPRESSED, IRRITABLE, OR HOPELESS: NOT AT ALL
1. LITTLE INTEREST OR PLEASURE IN DOING THINGS: NOT AT ALL
SUM OF ALL RESPONSES TO PHQ9 QUESTIONS 1 AND 2: 0

## 2020-03-30 NOTE — PROGRESS NOTES
"Obstetrics and Gynecology  Labor and Delivery Antepartum Note    ID: 34 y.o. is a  with IUP at 34w4d      Primary Obstetrician: Seth Morrissey M.D.    Assessing Obstetrician: Carole Truong MD    S: Pt doing well, she has been off magnesium nearly 24 hours and is feeling well. No contractions. No bright red bleeding. Reports brown light spotting today. No leaking of fluids. Baby moving well    ROS: 10 systems negative except as noted above.    O: BP (!) 97/60   Pulse 73   Temp 36.7 °C (98.1 °F) (Temporal)   Resp 17   Ht 1.6 m (5' 3\")   Wt 68 kg (150 lb)   LMP 2019 (Within Days)   SpO2 96%   Breastfeeding No   BMI 26.57 kg/m²    Gen: NAD, AAO  HEENT: NC/AT, MMM  Neck: no visible masses  Pulm: no distress  Abd: Gravid, soft, uterus NTTP, no rebound/guarding  Ext: WWP, 2+ DPP, no edema  Skin: no rash  Neuro: no gross deficits, EOMI  Psy: mood good, affect normal and congruent  Pelvic: SVE deferred    NST: 140s, pos accels, neg decels, moderate variability, reactive NST, cat 1 FHR  [place]: rare contractions    Recent Labs     20   WBC 10.6   RBC 3.66*   HEMOGLOBIN 10.9*   HEMATOCRIT 33.1*   MCV 90.4   MCH 29.8   RDW 48.2   PLATELETCT 294   MPV 10.2       Assessment:   Yuliana Hernández is a 34 y.o.  at 34w4d.  AVSS.  Reactive NST.  Posterior partial placenta previa  3rd trimester bleeding - currently stable, now almost 24 hours s/p magnesium sulfuate  S/p BMZ x 2 - 3/26 & 3/27  Desires sterilization - for bilateral salpingectomy     Plan:  Continue antepartum management  Bed rest  If contractions/labor and vaginal bleeding return patient will need delivery via primary   If her bleeding continues to be resolved consider d/c home on bed rest  NSTs/toco  Pt has had consent primary  and bilateral salpingectomy in case of need for emergent delivery        Evaluation and clinical decision making including analysis of fetal data, imaging and maternal lab work " completed over a 20 minute period.      Carole Truong M.D., 3/29/2020, 7:23 PM

## 2020-03-30 NOTE — PROGRESS NOTES
OB on call  S/ feels well, no bleeding today, no brown d/c, denies contractions, good fm  O/  Vitals:    20 1611 20 1923 20 0002 20 0404   BP: (!) 97/60 101/57 (!) 92/54 (!) 95/53   Pulse: 73 81 78 90   Resp: 17 18 16 16   Temp: 36.7 °C (98.1 °F) 36.2 °C (97.1 °F) 36.2 °C (97.2 °F) 36.9 °C (98.4 °F)   TempSrc: Temporal Temporal Temporal Temporal   SpO2:       Weight:       Height:         gen-comf  abd-soft nt   EFM-NST reactive this am  New [place]-rare contractions    No new labs    A&P/35yo  at 34w5d  Posterior placenta previa with heavy bleeding on admission; now stable and s/p bmz 3/26 and 3/27; off mag; no tocolysis  Will plan for c/sec between 36-37w  Anemia-likely acute on chronic  PT lives about 20 min from hospital  D/w -recommends continued hospital management until delivery; d/w patient and she understands

## 2020-03-30 NOTE — PROGRESS NOTES
0700- report received from DENIS Browne RN. Plan of care discussed.     0900- assessment done. Patient denies any contractions or leaking fluid. Patient states she occasionally has old brown blood when she wipes, but states she has not had any this AM. States positive fetal movement.     1220- Dr Ureña at bedside. Discussed plan of care and recommendation to stay inpatient. Patient questions answered. Patient agrees with plan of care.     1900- report given to DENIS Browne RN

## 2020-03-30 NOTE — CARE PLAN
Problem: Infection  Goal: Will remain free from infection  Outcome: PROGRESSING AS EXPECTED  Note: Patient remains afebrile. No S&S of infections       Problem: Pain  Goal: Alleviation of Pain or a reduction in pain to the patient's comfort goal  Outcome: PROGRESSING AS EXPECTED  Note: Patient denies any pain or contractions. Patient educated to notify RN if any pain or contractions develop

## 2020-03-30 NOTE — CARE PLAN
Problem: Risk for Infection, Impaired Wound Healing  Goal: Remain free from signs and symptoms of infection  Outcome: PROGRESSING AS EXPECTED  Note: Assessing and monitoring patient for s/s of infection and implementing precautions as needed. Educating patient and family concerning importance of hand hygiene     Problem: Pain  Goal: Alleviation of Pain or a reduction in pain to the patient's comfort goal  Outcome: PROGRESSING AS EXPECTED  Note: Assessing and monitoring patient's pain and implementing pharmacologic and nonpharmacologic means of pain management as needed. Educating patient concerning pain management options

## 2020-03-30 NOTE — PROGRESS NOTES
Late Entry    1900) Report received from SHEY Webster RN, POC discussed, assumed care of patient at this time    191) Dr. Truong at bedside to see patient    ) Assessment performed. Patient is a  EDC 5/6 which makes her 34.4 weeks. Patient denies any bright red vaginal bleeding but noted some light brown spotting today. Patient denies having any LOF or contractions at this time. Patient has no abdomen tenderness. States positive fetal movement.    2030) EFM placed on patient to obtain NST    0100) Patient resting on and off in bed with no s/s of distress noted, respirations even and unlabored, safety precautions remain in place    0700) Report to NIKA Jameson RN, POC discussed

## 2020-03-31 PROCEDURE — 302790 HCHG STAT ANTEPARTUM CARE, DAILY

## 2020-03-31 PROCEDURE — 59025 FETAL NON-STRESS TEST: CPT

## 2020-03-31 PROCEDURE — 700105 HCHG RX REV CODE 258: Performed by: OBSTETRICS & GYNECOLOGY

## 2020-03-31 PROCEDURE — 700102 HCHG RX REV CODE 250 W/ 637 OVERRIDE(OP): Performed by: OBSTETRICS & GYNECOLOGY

## 2020-03-31 PROCEDURE — 700111 HCHG RX REV CODE 636 W/ 250 OVERRIDE (IP): Performed by: OBSTETRICS & GYNECOLOGY

## 2020-03-31 PROCEDURE — A9270 NON-COVERED ITEM OR SERVICE: HCPCS | Performed by: OBSTETRICS & GYNECOLOGY

## 2020-03-31 PROCEDURE — 770002 HCHG ROOM/CARE - OB PRIVATE (112)

## 2020-03-31 RX ORDER — DIPHENHYDRAMINE HCL 25 MG
25 TABLET ORAL ONCE
Status: COMPLETED | OUTPATIENT
Start: 2020-03-31 | End: 2020-03-31

## 2020-03-31 RX ORDER — ACETAMINOPHEN 325 MG/1
650 TABLET ORAL ONCE
Status: COMPLETED | OUTPATIENT
Start: 2020-03-31 | End: 2020-03-31

## 2020-03-31 RX ORDER — DIPHENHYDRAMINE HYDROCHLORIDE 50 MG/ML
25 INJECTION INTRAMUSCULAR; INTRAVENOUS ONCE
Status: COMPLETED | OUTPATIENT
Start: 2020-03-31 | End: 2020-03-31

## 2020-03-31 RX ADMIN — SODIUM CHLORIDE 1000 MG: 9 INJECTION, SOLUTION INTRAVENOUS at 17:05

## 2020-03-31 RX ADMIN — ACETAMINOPHEN 650 MG: 325 TABLET, FILM COATED ORAL at 14:18

## 2020-03-31 RX ADMIN — SODIUM CHLORIDE, POTASSIUM CHLORIDE, SODIUM LACTATE AND CALCIUM CHLORIDE: 600; 310; 30; 20 INJECTION, SOLUTION INTRAVENOUS at 14:29

## 2020-03-31 RX ADMIN — SODIUM CHLORIDE 25 MG: 9 INJECTION, SOLUTION INTRAVENOUS at 14:30

## 2020-03-31 RX ADMIN — VITAMIN A, VITAMIN C, VITAMIN D-3, VITAMIN E, VITAMIN B-1, VITAMIN B-2, NIACIN, VITAMIN B-6, CALCIUM, IRON, ZINC, COPPER 1 TABLET: 4000; 120; 400; 22; 1.84; 3; 20; 10; 1; 12; 200; 27; 25; 2 TABLET ORAL at 07:49

## 2020-03-31 RX ADMIN — DIPHENHYDRAMINE HYDROCHLORIDE 25 MG: 25 TABLET ORAL at 14:18

## 2020-03-31 ASSESSMENT — PATIENT HEALTH QUESTIONNAIRE - PHQ9
1. LITTLE INTEREST OR PLEASURE IN DOING THINGS: NOT AT ALL
SUM OF ALL RESPONSES TO PHQ9 QUESTIONS 1 AND 2: 0
2. FEELING DOWN, DEPRESSED, IRRITABLE, OR HOPELESS: NOT AT ALL
SUM OF ALL RESPONSES TO PHQ9 QUESTIONS 1 AND 2: 0
1. LITTLE INTEREST OR PLEASURE IN DOING THINGS: NOT AT ALL
2. FEELING DOWN, DEPRESSED, IRRITABLE, OR HOPELESS: NOT AT ALL

## 2020-03-31 NOTE — PROGRESS NOTES
IV Iron Per Pharmacy Note    Patient Lean Body Weight:  52kg kg  Reason for Iron Replacement: anemia of pregnancy, d/w Dr Shearer - [facility] in pt with placenta previa       Lab Results   Component Value Date/Time    WBC 10.6 03/26/2020 08:10 PM    RBC 3.66 (L) 03/26/2020 08:10 PM    HEMOGLOBIN 10.9 (L) 03/26/2020 08:10 PM    HEMATOCRIT 33.1 (L) 03/26/2020 08:10 PM    MCV 90.4 03/26/2020 08:10 PM    MCH 29.8 03/26/2020 08:10 PM    MCHC 32.9 (L) 03/26/2020 08:10 PM    MPV 10.2 03/26/2020 08:10 PM         Assessment/Plan:  1. IV Iron Indicated.   2. Give Iron Dextran 25 mg IV test dose following diphenhydramine/acetaminophen premeds over 30 minutes per protocol.  3. If no reaction (Anaphylaxis, Hypotension/Hypertension, N/V/D, Chest pain/Back Pain, Urticaria/Pruritis) in the next hour, proceed to full dose. Nursing to call the pharmacy IV room at ext. [phone] for full dose.  4. Full dose: Iron Dextran 1000 mg IV over 4 hours. Continue to monitor for delayed ADR including: Arthralgia/myalgia, Headache/backache, chills/dizziness/malaise, moderate to high fever and n/v.      Bijal Jaen, PharmD, BCOP

## 2020-03-31 NOTE — PROGRESS NOTES
1900) Report received from NIKA Jameson RN, POC discussed, assumed care of patient at this time    ) Assessment performed. Patient is a  EDC 5/6 which makes her 34.5 weeks. Patient denies any vaginal bleeding, LOF or any painful contractions at this time. Patient has no abdomen tenderness. States positive fetal movement.    ) EFM and TOCO placed on patient to obtain NST    0300) Patient resting on and off in bed with no s/s of distress noted, respirations even and unlabored, safety precautions remain in place    0700) Report to KRIS Ledesma RN, POC discussed

## 2020-03-31 NOTE — CARE PLAN
Problem: Knowledge Deficit  Goal: Knowledge of disease process/condition, treatment plan, diagnostic tests, and medications will improve  Note: Pt encouraged to ask questions and verbalize needs     Problem: Risk for Fluid Imbalance  Goal: Promotion of Fluid Balance  Note: Watching for bleeding d/t partial previa

## 2020-03-31 NOTE — PROGRESS NOTES
L&D Progress Note    Name:   Yuliana Hernández   Date/Time:  3/31/2020 12:28 PM  Gestational Age:  34w6d  Admit Date:   3/26/2020  Admitting Dx:   Pregnancy  UNDESIRED FERTILITY, PLACENTA PREVIA, 39 WEEKS -  Pregnancy with third trimester bleeding, antepartum    Subjective:  Sitting on couch eating lunch. +fm, no bleeding but states feeling more ctxns today. Having some discharge today but denies leaking fluid    Objective:   Vitals:    03/30/20 2000 03/30/20 2357 03/31/20 0455 03/31/20 0808   BP: 106/63 100/53 109/58 109/71   Pulse: 75 80 75 81   Resp: 16 16 18 17   Temp: 36.5 °C (97.7 °F) 36.8 °C (98.2 °F) 36.6 °C (97.8 °F) 36.7 °C (98 °F)   TempSrc: Temporal Temporal Temporal Temporal   SpO2:       Weight:       Height:       gen: no acute distress  Abd: gravid nontender  Ext: no calf pain letty   SVE: Deferred  NST:  Cat 1, mod variability, occ ctxns     Labs:  No results found for this or any previous visit (from the past 72 hour(s)).      Assessment: Plan    Gestational Age:   34w6d  Posterior placenta previa with heavy bleeding on admission; now stable and s/p bmz 3/26 and 3/27; off mag; no tocolysis  Anemia - plan for iron infusion today. Spoke with pharmacy as to why I am ordering it with anticipated blood loss  Plan for hospital management until delivery  Due to previa   2 IV's in place/saline locked  For c/s with Bilateral salpingectomy 36-37 weeks            Meena Shearer M.D.

## 2020-03-31 NOTE — PROGRESS NOTES
0700- Report received from Keri DHILLON- poc discussed  0800- pt resting no c/o pain, bleeding, LOF, uc's, +FM  0908- nst complete, monitors off  1230- Dr. Shearer by to see pt orders for IV iron to be given  1415- benadryl and tylenol given  1430- IV iron test dose started  1500- test dose complete will watch for reaction for next hour  1705- Iron hung  1900- Report given to Sp ALVA RN- poc discussed

## 2020-04-01 PROCEDURE — A9270 NON-COVERED ITEM OR SERVICE: HCPCS | Performed by: OBSTETRICS & GYNECOLOGY

## 2020-04-01 PROCEDURE — 700102 HCHG RX REV CODE 250 W/ 637 OVERRIDE(OP): Performed by: OBSTETRICS & GYNECOLOGY

## 2020-04-01 PROCEDURE — 59025 FETAL NON-STRESS TEST: CPT

## 2020-04-01 PROCEDURE — 770002 HCHG ROOM/CARE - OB PRIVATE (112)

## 2020-04-01 PROCEDURE — 302790 HCHG STAT ANTEPARTUM CARE, DAILY

## 2020-04-01 PROCEDURE — 700112 HCHG RX REV CODE 229: Performed by: OBSTETRICS & GYNECOLOGY

## 2020-04-01 RX ADMIN — VITAMIN A, VITAMIN C, VITAMIN D-3, VITAMIN E, VITAMIN B-1, VITAMIN B-2, NIACIN, VITAMIN B-6, CALCIUM, IRON, ZINC, COPPER 1 TABLET: 4000; 120; 400; 22; 1.84; 3; 20; 10; 1; 12; 200; 27; 25; 2 TABLET ORAL at 08:31

## 2020-04-01 RX ADMIN — DOCUSATE SODIUM 100 MG: 100 CAPSULE, LIQUID FILLED ORAL at 12:33

## 2020-04-01 NOTE — PROGRESS NOTES
0700-Report received from HORACIO Martinez RN .POC discussed, pt verbalized understanding. Patient is a  edc 5/6 making her 35.0   weeks.     0815- Patient denies any regular or painful contractions, leaking of any fluid or any vaginal bleeding. States positive fetal movement. All questions answered, no further needs at this time.  Assessment complete.     1015- Dr Tafoya into see patient. Plan of care dicussed with patient. Patient agrees to plan of care.

## 2020-04-01 NOTE — PROGRESS NOTES
1900- report received from KRIS Ledesma RN. Pt has Iron infusion selina through patent IV. POC discussed. DEnies any UCs, cramping or new VB. No LOF. Reports +FM.   2016- Monitors applied for NST.  2044- Reactive NST obtained.  0700- report given to NIKA Palmer RN

## 2020-04-01 NOTE — PROGRESS NOTES
"Obstetrics and Gynecology  Labor and Delivery Antepartum Note    ID: 34 y.o. is a  with IUP at 35w0d    HD: 6    Primary Obstetrician: Seth Morrissey M.D.    Assessing Obstetrician: Haseeb Tafoya MD    CC: vaginal bleeding in the setting of placenta previa    S: Feeling well.  +FM.  No bright red bleeding, however continues to have mild brown discharge which has been less over the last day.  Intermittent contractions approximately q4h. denies leaking of fluid.  She has no other specific complaints today.    ROS: 10 systems negative except as noted above.    O: /55   Pulse 79   Temp 36.6 °C (97.9 °F) (Temporal)   Resp 16   Ht 1.6 m (5' 3\")   Wt 68 kg (150 lb)   LMP 2019 (Within Days)   SpO2 96%   Breastfeeding No   BMI 26.57 kg/m²    Gen: NAD, AAO  HEENT: NC/AT, MMM  Neck: no visible masses  CV: RRR  Pulm: no distress  Abd: Gravid, soft, uterus NTTP, no rebound/guarding  Ext: WWP, 2+ DPP, no edema  Skin: no rash  Neuro: no gross deficits, EOMI  Psy: mood good, affect normal and congruent  Pelvic: SVE deferred    NST: reactive, see separate procedure note   [place]: q25min    A/P: Yuliana Hernández is a 34 y.o.  at 35w0d, placenta previa.  AVSS.  Reactive NST.  *Placenta previa: no new bleeding currently.  Plan for inpatient admission until delivery in 1-2wk.  Pt's Primary OB to sched date of procedure.     - 2 large bore IV's in place.     - Plan  delivery with bilateral salpingectomy.   *Anemia: s/p iron infusion yesterday.    *FWB: reassuring, reactive NST.     - NST qshift    Dispo: inpatient until delivery    Haseeb Tafoya M.D., 2020, 10:48 AM     "

## 2020-04-01 NOTE — PROGRESS NOTES
Non-Stress Test    Baseline: 145bpm  Variability: moderate  Accelerations: present  Decelerations: absent    Force: CTX 25min    Impression: Reassuring, reactive, Cat I FHT.    Haseeb Tafoya MD, MS, 4/1/2020, 12:12 PM

## 2020-04-02 LAB
ABO GROUP BLD: NORMAL
BLD GP AB SCN SERPL QL: NORMAL
RH BLD: NORMAL

## 2020-04-02 PROCEDURE — 700112 HCHG RX REV CODE 229: Performed by: OBSTETRICS & GYNECOLOGY

## 2020-04-02 PROCEDURE — A9270 NON-COVERED ITEM OR SERVICE: HCPCS | Performed by: OBSTETRICS & GYNECOLOGY

## 2020-04-02 PROCEDURE — 86850 RBC ANTIBODY SCREEN: CPT

## 2020-04-02 PROCEDURE — 302790 HCHG STAT ANTEPARTUM CARE, DAILY

## 2020-04-02 PROCEDURE — 86900 BLOOD TYPING SEROLOGIC ABO: CPT

## 2020-04-02 PROCEDURE — 59025 FETAL NON-STRESS TEST: CPT

## 2020-04-02 PROCEDURE — 700102 HCHG RX REV CODE 250 W/ 637 OVERRIDE(OP): Performed by: OBSTETRICS & GYNECOLOGY

## 2020-04-02 PROCEDURE — 36415 COLL VENOUS BLD VENIPUNCTURE: CPT

## 2020-04-02 PROCEDURE — 770002 HCHG ROOM/CARE - OB PRIVATE (112)

## 2020-04-02 PROCEDURE — 86901 BLOOD TYPING SEROLOGIC RH(D): CPT

## 2020-04-02 RX ORDER — DOCUSATE SODIUM 100 MG/1
100 CAPSULE, LIQUID FILLED ORAL DAILY
Status: DISCONTINUED | OUTPATIENT
Start: 2020-04-03 | End: 2020-04-10 | Stop reason: HOSPADM

## 2020-04-02 RX ADMIN — DOCUSATE SODIUM 100 MG: 100 CAPSULE, LIQUID FILLED ORAL at 08:32

## 2020-04-02 RX ADMIN — VITAMIN A, VITAMIN C, VITAMIN D-3, VITAMIN E, VITAMIN B-1, VITAMIN B-2, NIACIN, VITAMIN B-6, CALCIUM, IRON, ZINC, COPPER 1 TABLET: 4000; 120; 400; 22; 1.84; 3; 20; 10; 1; 12; 200; 27; 25; 2 TABLET ORAL at 08:30

## 2020-04-02 NOTE — PROGRESS NOTES
0700- Report received from TJ Lilly RN.POC discussed, pt verbalized understanding. Patient is a  edc 5/6 making her 35.1 weeks.     0830- Patient denies any contractions, leaking of any fluid or any vaginal bleeding. States positive fetal movement. All questions answered, no further needs at this time. Assessment complete.     1130- reactive NST obtained. Patient states she felt a few contractions, but nothing more than 2/ hr. EFM off.     1900- report given to RAYMOND Casey RN

## 2020-04-02 NOTE — PROGRESS NOTES
1900-Rcvd report from dayshift RN and assumed care of pt.   1915-pt visiting with FOB.  2110-TOCO/FM applied. Pt denies any VB, mild UC's and +FM  2140-reactive NST obtained.  0600-no issues overnight.  0700-report given to dayshift RN

## 2020-04-02 NOTE — PROGRESS NOTES
Hospital Day : 6    S: only brown dc; no brb; good fm; no uc    O:  Vitals:    04/01/20 1610 04/01/20 1900 04/01/20 2357 04/02/20 0500   BP: 110/72      Pulse: 86      Resp: 16 16 16    Temp: 36.8 °C (98.2 °F) 36.6 °C (97.8 °F) 36.3 °C (97.4 °F) 36.4 °C (97.6 °F)   TempSrc: Temporal Temporal Temporal Temporal   SpO2:       Weight:       Height:                       Cat one; no uc; abd soft    A: iup 35.1; previa; sp bmeth; anemia; sp iron infusion; undesired fert; fetal status reassuring    P: cpm/2x iv/up to date hold clot; schedule 1ltcs/bs for 4/10/20; csx sooner prn

## 2020-04-03 PROCEDURE — 700102 HCHG RX REV CODE 250 W/ 637 OVERRIDE(OP): Performed by: OBSTETRICS & GYNECOLOGY

## 2020-04-03 PROCEDURE — 770002 HCHG ROOM/CARE - OB PRIVATE (112)

## 2020-04-03 PROCEDURE — 302790 HCHG STAT ANTEPARTUM CARE, DAILY

## 2020-04-03 PROCEDURE — A9270 NON-COVERED ITEM OR SERVICE: HCPCS | Performed by: OBSTETRICS & GYNECOLOGY

## 2020-04-03 PROCEDURE — 59025 FETAL NON-STRESS TEST: CPT

## 2020-04-03 PROCEDURE — 700112 HCHG RX REV CODE 229: Performed by: OBSTETRICS & GYNECOLOGY

## 2020-04-03 RX ADMIN — VITAMIN A, VITAMIN C, VITAMIN D-3, VITAMIN E, VITAMIN B-1, VITAMIN B-2, NIACIN, VITAMIN B-6, CALCIUM, IRON, ZINC, COPPER 1 TABLET: 4000; 120; 400; 22; 1.84; 3; 20; 10; 1; 12; 200; 27; 25; 2 TABLET ORAL at 08:49

## 2020-04-03 RX ADMIN — DOCUSATE SODIUM 100 MG: 100 CAPSULE, LIQUID FILLED ORAL at 08:49

## 2020-04-03 NOTE — PROGRESS NOTES
1900-Report from NIKA Jameson RN. POC discussed. Pt sitting up in chair. Denies UC's and confirms +FM.   2017-Monitors in place  2056-Reactive NST obtained, monitors removed  0700-Report to NIKA Jameson RN. POC discussed

## 2020-04-03 NOTE — CARE PLAN
Problem: Risk for Infection, Impaired Wound Healing  Goal: Remain free from signs and symptoms of infection  Outcome: PROGRESSING AS EXPECTED  Note: Pt afebrile, no s/s of infection     Problem: Risk for injury  Goal: Patient and fetus will be free of preventable injury/complications  Outcome: PROGRESSING AS EXPECTED  Note: Reactive NST obtained     Problem: Pain  Goal: Alleviation of Pain or a reduction in pain to the patient's comfort goal  Outcome: PROGRESSING AS EXPECTED  Note: Pt denies pain at this time

## 2020-04-03 NOTE — PROGRESS NOTES
0700-Report received from RAYMOND Casey RN.POC discussed, pt verbalized understanding. Patient is a  edc 5/6 making her 35.2 weeks.     0845-Patient states she has occasional contractions. States she only has 2/ hr. Denies any regular contractions. Patient denies any leaking of any fluid or any vaginal bleeding. States positive fetal movement. All questions answered, no further needs at this time. Assessment complete.     1700-report given to DUSTY Paige Rn

## 2020-04-03 NOTE — PROGRESS NOTES
Hospital Day : 7    S: stable; no brb; no uc; good fm    O:  Vitals:    04/02/20 1620 04/02/20 1908 04/02/20 2358 04/03/20 0412   BP: 114/66 107/67 (!) 96/58 (!) 93/60   Pulse: (!) 101 94 93 86   Resp: 17 16 16 16   Temp: 36.7 °C (98 °F) 36.3 °C (97.3 °F) 37.1 °C (98.8 °F) 36.2 °C (97.2 °F)   TempSrc: Temporal Temporal Temporal Temporal   SpO2:       Weight:       Height:                       abd soft size=;   Cat one; occ irrit; no uc    A: iup 35.2; sp bmeth; previa; anemia/sp iron; undesired fertility; fetal status reassuring    P: cpm; csx/bs scheduled for 4/10/20

## 2020-04-04 PROCEDURE — 59025 FETAL NON-STRESS TEST: CPT

## 2020-04-04 PROCEDURE — A9270 NON-COVERED ITEM OR SERVICE: HCPCS | Performed by: OBSTETRICS & GYNECOLOGY

## 2020-04-04 PROCEDURE — 700102 HCHG RX REV CODE 250 W/ 637 OVERRIDE(OP): Performed by: OBSTETRICS & GYNECOLOGY

## 2020-04-04 PROCEDURE — 770002 HCHG ROOM/CARE - OB PRIVATE (112)

## 2020-04-04 PROCEDURE — 302790 HCHG STAT ANTEPARTUM CARE, DAILY

## 2020-04-04 RX ADMIN — VITAMIN A, VITAMIN C, VITAMIN D-3, VITAMIN E, VITAMIN B-1, VITAMIN B-2, NIACIN, VITAMIN B-6, CALCIUM, IRON, ZINC, COPPER 1 TABLET: 4000; 120; 400; 22; 1.84; 3; 20; 10; 1; 12; 200; 27; 25; 2 TABLET ORAL at 08:23

## 2020-04-04 NOTE — PROGRESS NOTES
1700- Report received. Care assumed. Pt sitting up on sofa, coloring. Denies needs or concerns. Encouraged to call as needed.   1850- Report to SHEY Casey RN.

## 2020-04-04 NOTE — PROGRESS NOTES
1850-Report from ROBYN Paige RN. POC discussed  1955-Pt sitting up in chair, eating dinner. Denies pain, UC's or VB and confirms +FM. Denies needs at this time  2040-Monitors applied for NST  2105-Reactive NST, monitors removed. Pt denies needs at this time  0700-Report to KRIS Ledesma RN. POC discussed

## 2020-04-04 NOTE — PROGRESS NOTES
0700- Bedside report received from Sultana DHILLON- poc discussed  0800- pt resting no c/o pain, bleeding, LOF, uc's, +FM, morning meds given  1052- monitors on  1122- monirots off reactive nst complete  1400- pt resting no complaints  1700- pt resting no bleeding, feels a occasional uc will let me know if she feels more uc's  1900- Report given to Luana DHILLON- poc discussed

## 2020-04-04 NOTE — PROGRESS NOTES
Progress Note    Subjective: No complaints, baby moving well.  Irregular contractions, no LOF, no VB    ROS neg cough fever sob    Objective Data:            Vitals:    04/03/20 1855 04/03/20 2339 04/04/20 0349 04/04/20 0829   BP: 110/69 102/61 100/58 104/68   Pulse: (!) 104 93 80 97   Resp: 17 16 17 16   Temp: 36.7 °C (98 °F) 37.1 °C (98.7 °F) 36.8 °C (98.3 °F) 36.8 °C (98.2 °F)   TempSrc: Temporal Temporal Temporal Temporal   SpO2:       Weight:       Height:            reactive no decels   [place] no contractions    Current Facility-Administered Medications   Medication Dose Route Frequency Provider Last Rate Last Dose   • docusate sodium (COLACE) capsule 100 mg  100 mg Oral DAILY Seth Morrissey M.D.   100 mg at 04/03/20 0849   • lactated ringers infusion   Intravenous Continuous Amy Marcial M.D. 75 mL/hr at 03/31/20 1429     • prenatal plus vitamin (STUARTNATAL 1+1) 27-1 MG tablet 1 Tab  1 Tab Oral DAILY Amy Marcial M.D.   1 Tab at 04/04/20 0823   • ondansetron (ZOFRAN) syringe/vial injection 4 mg  4 mg Intravenous Q6HRS PRN Amy Marcial M.D.       • calcium GLUConate injection 1 g  1 g Intravenous Once PRN Amy Marcial M.D.           Assessment: IUP 35 weeks 3 days placenta previa  S/p mag and BMZ    Plan: Csection 4/10 unless patient has bleeding

## 2020-04-04 NOTE — CARE PLAN
Problem: Risk for Infection, Impaired Wound Healing  Goal: Remain free from signs and symptoms of infection  Outcome: PROGRESSING AS EXPECTED  Note: Pt afebrile, no s/s of infection     Problem: Pain  Goal: Alleviation of Pain or a reduction in pain to the patient's comfort goal  Outcome: PROGRESSING AS EXPECTED  Note: Pt denies pain at this time

## 2020-04-05 PROCEDURE — 302790 HCHG STAT ANTEPARTUM CARE, DAILY

## 2020-04-05 PROCEDURE — A9270 NON-COVERED ITEM OR SERVICE: HCPCS | Performed by: OBSTETRICS & GYNECOLOGY

## 2020-04-05 PROCEDURE — 770002 HCHG ROOM/CARE - OB PRIVATE (112)

## 2020-04-05 PROCEDURE — 700102 HCHG RX REV CODE 250 W/ 637 OVERRIDE(OP): Performed by: OBSTETRICS & GYNECOLOGY

## 2020-04-05 PROCEDURE — 59025 FETAL NON-STRESS TEST: CPT

## 2020-04-05 RX ADMIN — VITAMIN A, VITAMIN C, VITAMIN D-3, VITAMIN E, VITAMIN B-1, VITAMIN B-2, NIACIN, VITAMIN B-6, CALCIUM, IRON, ZINC, COPPER 1 TABLET: 4000; 120; 400; 22; 1.84; 3; 20; 10; 1; 12; 200; 27; 25; 2 TABLET ORAL at 08:07

## 2020-04-05 ASSESSMENT — PATIENT HEALTH QUESTIONNAIRE - PHQ9
1. LITTLE INTEREST OR PLEASURE IN DOING THINGS: NOT AT ALL
2. FEELING DOWN, DEPRESSED, IRRITABLE, OR HOPELESS: NOT AT ALL
SUM OF ALL RESPONSES TO PHQ9 QUESTIONS 1 AND 2: 0

## 2020-04-05 NOTE — PROGRESS NOTES
Progress Note    Subjective: No complaints this am.  Good FM, no vaginal bleeding, no LOF some discharge.  occasional contractions  ROS no couhg or SOB, no fever    Objective Data:            Vitals:    04/04/20 2011 04/05/20 0005 04/05/20 0401 04/05/20 0800   BP: 111/71 (!) 98/57 100/57 104/66   Pulse: 92 86 91 91   Resp: 16 16 16 18   Temp: 36.6 °C (97.9 °F) 36.1 °C (97 °F) 36.4 °C (97.6 °F) 36.3 °C (97.4 °F)   TempSrc: Temporal Temporal Temporal Temporal   SpO2:       Weight:       Height:           Abd soft non tender      NST pending    Current Facility-Administered Medications   Medication Dose Route Frequency Provider Last Rate Last Dose   • docusate sodium (COLACE) capsule 100 mg  100 mg Oral DAILY Seth Morrissey M.D.   100 mg at 04/03/20 0849   • lactated ringers infusion   Intravenous Continuous Amy Marcial M.D. 75 mL/hr at 03/31/20 1429     • prenatal plus vitamin (STUARTNATAL 1+1) 27-1 MG tablet 1 Tab  1 Tab Oral DAILY Amy Marcial M.D.   1 Tab at 04/05/20 0807   • ondansetron (ZOFRAN) syringe/vial injection 4 mg  4 mg Intravenous Q6HRS PRN Amy Marcial M.D.       • calcium GLUConate injection 1 g  1 g Intravenous Once PRN Amy Marcial M.D.           Assessment: IUP 35 4/7 placenta previa     Plan: csection at 37 weeks

## 2020-04-05 NOTE — PROGRESS NOTES
"1900-rcvd report from dayshift RN and assumed care of pt.  2108-TOCO/FM applied. Pt denies and VB, LOF and +FM. Pt having occasional UC's but not \"too painful\"  2136-reactive NST obtained.   0600-quiet night. No complaints/issues.  0700-report given to dayshift RN    "

## 2020-04-05 NOTE — CARE PLAN
Problem: Risk for Infection, Impaired Wound Healing  Goal: Remain free from signs and symptoms of infection  Outcome: PROGRESSING AS EXPECTED  Note: Pt remains free from signs/symptoms of infection.     Problem: Pain  Goal: Alleviation of Pain or a reduction in pain to the patient's comfort goal  Outcome: PROGRESSING AS EXPECTED  Note: Pt denies pain, able to rest comfortably.

## 2020-04-05 NOTE — PROGRESS NOTES
0700- Received report from PAVEL Lilly RN.   0800- Pt reports scant amount of old blood discharge, denies VB, LOF, or regular UC's.  Reports +FM.   1127- Reactive NST obtained.

## 2020-04-06 PROCEDURE — 700112 HCHG RX REV CODE 229: Performed by: OBSTETRICS & GYNECOLOGY

## 2020-04-06 PROCEDURE — 700102 HCHG RX REV CODE 250 W/ 637 OVERRIDE(OP): Performed by: OBSTETRICS & GYNECOLOGY

## 2020-04-06 PROCEDURE — A9270 NON-COVERED ITEM OR SERVICE: HCPCS | Performed by: OBSTETRICS & GYNECOLOGY

## 2020-04-06 PROCEDURE — 59025 FETAL NON-STRESS TEST: CPT

## 2020-04-06 PROCEDURE — 302790 HCHG STAT ANTEPARTUM CARE, DAILY

## 2020-04-06 PROCEDURE — 770002 HCHG ROOM/CARE - OB PRIVATE (112)

## 2020-04-06 RX ADMIN — VITAMIN A, VITAMIN C, VITAMIN D-3, VITAMIN E, VITAMIN B-1, VITAMIN B-2, NIACIN, VITAMIN B-6, CALCIUM, IRON, ZINC, COPPER: 4000; 120; 400; 22; 1.84; 3; 20; 10; 1; 12; 200; 27; 25; 2 TABLET ORAL at 09:28

## 2020-04-06 RX ADMIN — DOCUSATE SODIUM 100 MG: 100 CAPSULE, LIQUID FILLED ORAL at 09:28

## 2020-04-06 NOTE — PROGRESS NOTES
1900-report received from RAYMOND Webster RN. Pt reports +FM. Denies LOF or UCs. Reports very little brown discharge throughout the day. Pt transferred to s2 to have a bigger room.   0700-no changes in pt status. Report given to Kenya DHILLON.

## 2020-04-07 PROCEDURE — 700102 HCHG RX REV CODE 250 W/ 637 OVERRIDE(OP): Performed by: OBSTETRICS & GYNECOLOGY

## 2020-04-07 PROCEDURE — A9270 NON-COVERED ITEM OR SERVICE: HCPCS | Performed by: OBSTETRICS & GYNECOLOGY

## 2020-04-07 PROCEDURE — 770002 HCHG ROOM/CARE - OB PRIVATE (112)

## 2020-04-07 PROCEDURE — 59025 FETAL NON-STRESS TEST: CPT

## 2020-04-07 PROCEDURE — 700112 HCHG RX REV CODE 229: Performed by: OBSTETRICS & GYNECOLOGY

## 2020-04-07 PROCEDURE — 302790 HCHG STAT ANTEPARTUM CARE, DAILY

## 2020-04-07 RX ADMIN — DOCUSATE SODIUM 100 MG: 100 CAPSULE, LIQUID FILLED ORAL at 08:40

## 2020-04-07 RX ADMIN — VITAMIN A, VITAMIN C, VITAMIN D-3, VITAMIN E, VITAMIN B-1, VITAMIN B-2, NIACIN, VITAMIN B-6, CALCIUM, IRON, ZINC, COPPER 1 TABLET: 4000; 120; 400; 22; 1.84; 3; 20; 10; 1; 12; 200; 27; 25; 2 TABLET ORAL at 08:40

## 2020-04-07 NOTE — PROGRESS NOTES
"Obstetrics and Gynecology  Labor and Delivery Antepartum Note    ID: 34 y.o. is a  with IUP at 35w5d    Primary Obstetrician: Seth Morrissey M.D.    Assessing Obstetrician: Carole Truong MD      S: Pt doing well. No VB or LOF. Has had some brown discharge but nothing bright red. Has noted approx 1 contraction an hour. Baby moving well. Pt ready for her upcoming . No f/c. Ambulating. Voiding. Passing gas and having bowel movements normally.    ROS: 10 systems negative except as noted above.    O: /71   Pulse 83   Temp 36.7 °C (98 °F) (Temporal)   Resp 17   Ht 1.6 m (5' 3\")   Wt 68 kg (150 lb)   LMP 2019 (Within Days)   SpO2 96%   Breastfeeding No   BMI 26.57 kg/m²    Gen: NAD, AAO  HEENT: NC/AT, MMM  Neck: no visible masses  Pulm: no distress  Abd: Gravid, soft, uterus NTTP, no rebound/guarding  Ext: WWP, 2+ DPP, no edema  Skin: no rash  Neuro: no gross deficits, EOMI  Psy: mood good, affect normal and congruent  Pelvic: SVE deferred    NST: 130, pos accels, neg decels, moderate variability, reactive NST, cat 1 FHR  Kathleen: irritability on NST      A/P: Yuliana Hernández is a 34 y.o.  at 35w5d.  AVSS.  Reactive NST.  Placenta previa - stable, no vaginal bleeding, no signs of labor at this time, for primary  4/10/20 with Dr. Morrissey or sooner with vaginal bleeding or labor, continue antepartum management, NSTs Q shift  S/p BMZ x 2 3/26 & 3/27  Anemia s/p iron infusion  Undesired fertility - desires permanent sterilization, bilateral salpingectomy with     Evaluation and clinical decision making including analysis of fetal data, imaging and maternal lab work completed over a 20 minute period.          Carole Truong M.D., 2020, 9:24 PM     "

## 2020-04-07 NOTE — PROGRESS NOTES
PROGRESS NOTE    DATE: 20  Hospital day: 12  Gestational Age: 35w6d, Estimated Date of Delivery: 20  Primary OB/GYN: Seth Morrissey M.D.   [facility] Consultant: JAYCEE Castillo  Yuliana Hernández is a 34 y.o.  female with Estimated Date of Delivery: 20 at 35w6d gestation who initially presented with vaginal bleeding.  Patient is being admitted for  labor and posterior placenta with partial previa.     Patient endorses good fetal movement. Reports contractions ever hour or so. Denies vaginal bleeding or loss of fluid. Denies headaches, changed in vision, shortness of breath, chest pain, and abdominal pain. Reports she is ambulating without difficulty and voiding spontaneously.     Review of systems: Pertinent items are noted in HPI.    OBJECTIVE:  Vitals:    20 0822   BP: (!) 94/60   Pulse: 88   Resp: 16   Temp: 36.8 °C (98.2 °F)   SpO2:     General:   Alert, conversational, pleasant, no acute distress  Lungs:   No respiratory distress  Heart:   Regular rate   Abdomen:  Soft, gravid, non-tender, non-distended   Genitourinary: Deferred    OB Ultrasound:  IMAGING:  Anatomy ultrasound report reviewed.  Posterior placenta with   complete placenta previa, normal fetal anatomy, male.  Repeat ultrasound on    shows a posterior placenta previa, normal anatomy, growth 44th   percentile, 2 pounds 7 ounces.     FHT: Fetal heart variability: moderate  Fetal Heart Rate decelerations: none  Fetal Heart Rate accelerations: yes  Baseline FHR: 130 beats per minute  Uterine contractions: irregular, every 5-10 minutes    Medications:   Current Facility-Administered Medications   Medication Dose Route Frequency Provider Last Rate Last Dose   • docusate sodium (COLACE) capsule 100 mg  100 mg Oral DAILY Seth Morrissey M.D.   100 mg at 20 0840   • lactated ringers infusion   Intravenous Continuous Amy Marcial M.D. 75 mL/hr at 20 1429     • prenatal plus vitamin  (STUARTNATAL 1+1) 27-1 MG tablet 1 Tab  1 Tab Oral DAILY Amy Marcial M.D.   1 Tab at 20 0840   • ondansetron (ZOFRAN) syringe/vial injection 4 mg  4 mg Intravenous Q6HRS PRN Amy Marcial M.D.       • calcium GLUConate injection 1 g  1 g Intravenous Once PRN Amy Marcial M.D.            Labs: No results found for this or any previous visit (from the past 24 hour(s)).    Imaging: Us-ob Limited Growth Follow Up Is The Patient Pregnant? Yes    Result Date: 3/17/2020  3/17/2020 7:45 PM HISTORY/REASON FOR EXAM:  History of rupture of membranes with complete placenta previa TECHNIQUE/EXAM DESCRIPTION: OB limited ultrasound. COMPARISON:  None FINDINGS: Fetal Lie:  Vertex LMP:  2019 Clinical MARGUERITE by LMP:  2020 Placenta (Location):  Posterior Placenta Previa: No Placental Grade: Amniotic Fluid Volume:  SORAYA = 14.84 cm Fetal Heart Rate:  127 bpm Cervical Length:  3.99 cm No maternal adnexal mass is identified. Umbilical Artery S/D Ratio(s):  Not calculated. Fetal Biometry BPD    8.52 cm, 34 weeks, 3 days HC    31.94 cm, 36 weeks, 0 days AC    29.37 cm, 33 weeks, 3 days Femur Length    6.40 cm, 33 weeks, 1 days Humerus Length    5.54 cm, 32 weeks, 2 days Cerebellum Diameter , EGA by this US:  34 weeks, 2 days MARGUERITE by this US: 2020 MARGUERITE by 1st US: Estimated Fetal Weight:  2239 grams EFW Percentile: 26% Comments:     1.  Single intrauterine pregnancy of an estimated gestational age of 34 weeks, 2 days with an estimated date of delivery of 2020. 2.  There is a posterior placenta without evidence of previa.       TREATMENT & PROPHYLAXIS  Steroids: Betamethasone 12mg x2 doses given on 3/26-3/27 for fetal lung maturity  Magnesium: 48 hours on admission  Antibiotics: n/a  VTE Prophylaxis: n/a    ASSESSMENT & PLAN  A 34-year-old  3, para 2-0-0-2 at 35 weeks and 6 day by ultrasound, EDC 2020.     1.   intrauterine pregnancy at 35 weeks and 6 day.  2.  Vaginal bleeding.  3.   Posterior placenta with partial previa.  4.  Fetal breech presentation.    PLAN:  - Continue inpatient management  - NST BID for 1hr  - CBC and COD in a.m.  - Monitor for VB and PT CXTs  - Scheduled for C/S on Friday     Plan of care discussed with the patient and she is in agreement with this plan. Plan of care discussed with RN taking carer of patient. All questions and concerns were answered.     Amy Marcial M.D.

## 2020-04-07 NOTE — PROGRESS NOTES
0700- Report received from [facility] shift RN. POC discussed.     1205- Reactive NST obtained. Pt states she does feel UCs, but that they are 1-2 an hour and not very strong. Pt will notify RN with any changes.     1645- Pt c/o more frequent UCs. States they are still 5/10 when they happen and are about 1-2 an hour. Monitors placed x2.     1810- monitor removed. Pt states UCs have not increased in frequency or intensity. RN educates pt to notify if this changes. No new bleeding.     1900- Report to [facility] shift RN. POC discussed.

## 2020-04-07 NOTE — PROGRESS NOTES
1900-report recived. Pt reports +FM. Denies LOF or UCs. Reports brown discharge throughout the day - no active bleeding.   0700-pt has been stable throughout the night. Report given to dayshift RN.

## 2020-04-08 LAB
ABO GROUP BLD: NORMAL
BLD GP AB SCN SERPL QL: NORMAL
ERYTHROCYTE [DISTWIDTH] IN BLOOD BY AUTOMATED COUNT: 50.6 FL (ref 35.9–50)
HCT VFR BLD AUTO: 31.3 % (ref 37–47)
HGB BLD-MCNC: 10.1 G/DL (ref 12–16)
MCH RBC QN AUTO: 30.1 PG (ref 27–33)
MCHC RBC AUTO-ENTMCNC: 32.3 G/DL (ref 33.6–35)
MCV RBC AUTO: 93.2 FL (ref 81.4–97.8)
PLATELET # BLD AUTO: 262 K/UL (ref 164–446)
PMV BLD AUTO: 10.2 FL (ref 9–12.9)
RBC # BLD AUTO: 3.36 M/UL (ref 4.2–5.4)
RH BLD: NORMAL
WBC # BLD AUTO: 8.9 K/UL (ref 4.8–10.8)

## 2020-04-08 PROCEDURE — 86850 RBC ANTIBODY SCREEN: CPT

## 2020-04-08 PROCEDURE — 700102 HCHG RX REV CODE 250 W/ 637 OVERRIDE(OP): Performed by: OBSTETRICS & GYNECOLOGY

## 2020-04-08 PROCEDURE — 85027 COMPLETE CBC AUTOMATED: CPT

## 2020-04-08 PROCEDURE — A9270 NON-COVERED ITEM OR SERVICE: HCPCS | Performed by: OBSTETRICS & GYNECOLOGY

## 2020-04-08 PROCEDURE — 36415 COLL VENOUS BLD VENIPUNCTURE: CPT

## 2020-04-08 PROCEDURE — 86900 BLOOD TYPING SEROLOGIC ABO: CPT

## 2020-04-08 PROCEDURE — 770002 HCHG ROOM/CARE - OB PRIVATE (112)

## 2020-04-08 PROCEDURE — 86901 BLOOD TYPING SEROLOGIC RH(D): CPT

## 2020-04-08 PROCEDURE — 700112 HCHG RX REV CODE 229: Performed by: OBSTETRICS & GYNECOLOGY

## 2020-04-08 PROCEDURE — 59025 FETAL NON-STRESS TEST: CPT

## 2020-04-08 PROCEDURE — 302790 HCHG STAT ANTEPARTUM CARE, DAILY

## 2020-04-08 RX ADMIN — DOCUSATE SODIUM 100 MG: 100 CAPSULE, LIQUID FILLED ORAL at 08:14

## 2020-04-08 RX ADMIN — VITAMIN A, VITAMIN C, VITAMIN D-3, VITAMIN E, VITAMIN B-1, VITAMIN B-2, NIACIN, VITAMIN B-6, CALCIUM, IRON, ZINC, COPPER 1 TABLET: 4000; 120; 400; 22; 1.84; 3; 20; 10; 1; 12; 200; 27; 25; 2 TABLET ORAL at 08:14

## 2020-04-08 NOTE — CARE PLAN
Problem: Knowledge Deficit  Goal: Knowledge of disease process/condition, treatment plan, diagnostic tests, and medications will improve  Note: Pt encouraged to verbalize needs and wants     Problem: Risk for injury  Goal: Fetus will be free of preventable trauma or other complications  Note: Nst qshift

## 2020-04-08 NOTE — PROGRESS NOTES
"1900- Received report from CLEMENTE Hurtado RN. POC discussed, all questions answered. Pt sitting on couch with FOB. Pt states nothing has changed and knows to let RN know if contractions begin again.   1943- TOCO and US applied. Pt states +FM and contractions \"about 4 an hour. They are 6/10 pain which is what I told the day shift nurse. I would say they are the same.\" Pt also states some brown, old blood discharge. She states no new bleeding. She knows to let RN know if her contractions get more intense or frequent. Denies LOF such as water breaking.   0000- Pt sleeping, VSS. No needs at this time.   0450- Pt sleeping, VSS no needs at this time.   0700- Report given to KIRS Ledesma RN.   "

## 2020-04-08 NOTE — PROGRESS NOTES
0700- Report received from Rere DHILLON- poc discussed  0800- pt resting no c/o pain, bleeding, LOF, uc's, +FM  0815- morning meds given, pt will let me know when she wants monitoring done  1157- reactive nst complete, monitors off  1600- pt resting no complaints  1900- Report given to Rere DHILLON- poc discussed

## 2020-04-09 PROCEDURE — 700112 HCHG RX REV CODE 229: Performed by: OBSTETRICS & GYNECOLOGY

## 2020-04-09 PROCEDURE — 700102 HCHG RX REV CODE 250 W/ 637 OVERRIDE(OP): Performed by: OBSTETRICS & GYNECOLOGY

## 2020-04-09 PROCEDURE — A9270 NON-COVERED ITEM OR SERVICE: HCPCS | Performed by: OBSTETRICS & GYNECOLOGY

## 2020-04-09 PROCEDURE — 59025 FETAL NON-STRESS TEST: CPT

## 2020-04-09 PROCEDURE — 770002 HCHG ROOM/CARE - OB PRIVATE (112)

## 2020-04-09 PROCEDURE — 302790 HCHG STAT ANTEPARTUM CARE, DAILY

## 2020-04-09 RX ADMIN — DOCUSATE SODIUM 100 MG: 100 CAPSULE, LIQUID FILLED ORAL at 07:49

## 2020-04-09 RX ADMIN — VITAMIN A, VITAMIN C, VITAMIN D-3, VITAMIN E, VITAMIN B-1, VITAMIN B-2, NIACIN, VITAMIN B-6, CALCIUM, IRON, ZINC, COPPER 1 TABLET: 4000; 120; 400; 22; 1.84; 3; 20; 10; 1; 12; 200; 27; 25; 2 TABLET ORAL at 07:49

## 2020-04-09 NOTE — CARE PLAN
Problem: Risk for injury  Goal: Fetus will be free of preventable trauma or other complications  Note: Monitoring done every shift on baby     Problem: Pain  Goal: Patient will have relaxed facial expressions and be able to rest between uterine contractions  Note: Pt is pain free at this time

## 2020-04-09 NOTE — CARE PLAN
Problem: Infection  Goal: Will remain free from infection  Outcome: PROGRESSING AS EXPECTED  Note: Pt has remained free from s/sx of infection      Problem: Pain  Goal: Alleviation of Pain or a reduction in pain to the patient's comfort goal  Outcome: PROGRESSING AS EXPECTED  Note: Pt has stated no pain

## 2020-04-09 NOTE — H&P
DATE OF ADMISSION:  2020.    DATE OF UPDATE:  2020.    Please refer to Dr. Amy Marcial's admission H and P for pertinent details of   the patient's history.    IDENTIFICATION:  This is a 34-year-old  3, para 2-0-0-2 who was   admitted at 34 and 1/7th weeks with an EDC of 2020 with a chief   complaint of vaginal bleeding.    HISTORY OF PRESENT ILLNESS:  This is a patient of [facility] with good prenatal   care.  Prenatal care was complicated by a placenta previa, which was diagnosed   in February.  Patient was evaluated on  for vaginal discharge and a   repeat ultrasound showed a posterior placenta without previa.  However, she   came in on the  with vaginal bleeding and was found to have a placenta   previa posterior partial with probable succenturiate lobe lying in close   proximity to the cervical os.  She was subsequently admitted, received a   course of steroids and her bleeding has been crescent since admission.  Fetal   heart tracings reactive, category 1.  She is not elizabeth.  She has no   other complaints of nausea, vomiting, fever, chills, change in bowel or   bladder habits.  Admits to good fetal movement.  Denies symptoms of PIH,   headaches, visual changes, epigastric pain.  She is in a committed monogamous   relationship and would like permanent sterilization.  Subsequently, she would   like to move forward today with primary low transverse  and bilateral   salpingectomy.    ASSESSMENT:  1.  At this time is pregnancy at 36 and 1/7th weeks.  2.  Posterior placenta with partial previa.  3.  Undesired fertility and multiparity, desires permanent sterilization.  4.  Status post betamethasone course.  5.  Status post magnesium tocolysis.    PLAN:  At this time, patient has been extensively counseled on risks,   benefits, complications, and alternatives to surgery, which include but are   not limited to risk of anesthesia, risk of injury to bowel, bladder, ureters,    major vessels, nerves, pelvis, risk of blood clots in her legs and lungs and   subsequent postop pneumonia.  She accepts these risks.  She also realizes that   bilateral salpingectomy is permanent method of birth control and there are   alternative methods of birth control including but not limited to pills,   patches, IUDs, Depo-Provera, vasectomy for her spouse.  She, however, believes   that bilateral salpingectomy is best method of birth control for her.  She   understands that by removing both of her tubes will hypothetically decrease   her risk of ovarian cancer in the future, but not prevent it.  She is   comfortable with this as well.  Plan subsequently is primary low transverse    and bilateral salpingectomy.       ____________________________________     MD CORONA Johnson / NTS    DD:  2020 09:28:22  DT:  2020 09:51:44    D#:  [id number]  Job#:  [id number]

## 2020-04-09 NOTE — PROGRESS NOTES
0700- Report received from Rere DHILLON- poc discussed  0800- pt resting no c/o pain, bleeding, LOF, uc's, +FM  0956- monitors on  1025- reactive nst complete monitors off, bedside report given to Phil DHILLON- poc discussed

## 2020-04-09 NOTE — PROGRESS NOTES
"Obstetrics and Gynecology  Labor and Delivery Antepartum Note    ID: 34 y.o. is a  with IUP at 36w0d      Primary Obstetrician: Seth Morrissey M.D.    Assessing Obstetrician: Haseeb Tafoya MD    S: Pt doing well today. She has  planned in 2 days from now. No VB or LOF. Has still had some light brown discharge but nothing bright red. Has noted approx 1 to 4 contractions per hour. Baby moving well. No f/c. Ambulating. Voiding. Passing gas and having bowel movements normally. Questions answered about upcoming surgery, duration of hospital stay discussed.    ROS: 10 systems negative except as noted above.    O: /68   Pulse 87   Temp 36.6 °C (97.9 °F) (Temporal)   Resp 18   Ht 1.6 m (5' 3\")   Wt 68 kg (150 lb)   LMP 2019 (Within Days)   SpO2 96%   Breastfeeding No   BMI 26.57 kg/m²      Gen: NAD, AAO  HEENT: NC/AT, MMM  Neck: no visible masses  Pulm: no distress  Abd: Gravid, soft, uterus NTTP, no rebound/guarding  Ext: WWP, 2+ DPP, no edema  Skin: no rash  Neuro: no gross deficits, EOMI  Psy: mood good, affect normal and congruent  Pelvic: SVE deferred      NST: 140, pos accels, neg decels, moderate variability, reactive NST  [place]: irritability    Recent Labs     20  0426   WBC 8.9   RBC 3.36*   HEMOGLOBIN 10.1*   HEMATOCRIT 31.3*   MCV 93.2   MCH 30.1   RDW 50.6*   PLATELETCT 262   MPV 10.2       A/P: Yuliana Hernández is a 34 y.o.  at 36w0d.  AVSS.  Reactive NST.  Placenta previa - stable, no vaginal bleeding, no signs of labor at this time, for primary  4/10/20 with Dr. Morrissey or sooner with vaginal bleeding or labor, continue antepartum management, NSTs Q shift  S/p BMZ x 2 3/26 & 3/27  Anemia s/p iron infusion - H/H 10.1/31.3 today  Undesired fertility - desires permanent sterilization, for bilateral salpingectomy with     Evaluation and clinical decision making including analysis of fetal data, imaging and maternal lab work completed over a 20 " minute period.        Carole Truong M.D., 4/8/2020, 8:02 PM

## 2020-04-09 NOTE — PROGRESS NOTES
1030: Report received from Elvia DHILLON. No needs at this time  1700: RN discussed what to expect tomorrow with her c/s; questions answered  1900: Report given to Suzan DHILLON

## 2020-04-09 NOTE — PROGRESS NOTES
1900- Report received from KRIS Ledesma RN. POC discussed.   2003- TOCO and US applied. Pt states her normal amount of brown discharge, no VB, less contractions today and no LOF. She states +FM.   0000- Pt sleeping, VSS. No needs at this time.  0700- Report given to KRIS Ledesma RN

## 2020-04-09 NOTE — H&P
DATE OF ADMISSION:  2020    DATE OF UPDATE:  2020    Please refer to Dr. Amy Marcial's admission H and P for pertinent details of   the patient's history.    IDENTIFICATION:  This is a 34-year-old  3, para 2-0-0-2 who was   admitted at 34 and 1/7th weeks with an EDC of 2020 with a chief   complaint of vaginal bleeding.    HISTORY OF PRESENT ILLNESS:  This is a patient of mine with good prenatal   care.  Prenatal care was complicated by a placenta previa, which was diagnosed   in February.  Patient was evaluated on  for vaginal discharge and a   repeat ultrasound showed a posterior placenta without previa.  However, she   came in on the  with vaginal bleeding and was found to have a placenta   previa posterior partial with probable succenturiate lobe lying in close   proximity to the cervical os.  She was subsequently admitted, received a   course of steroids and her bleeding has been quiescent since admission.  Fetal   heart tracings reactive, category 1.  She is not elizabeth.  She has no   other complaints of nausea, vomiting, fever, chills, change in bowel or   bladder habits.  Admits to good fetal movement.  Denies symptoms of PIH,   headaches, visual changes, epigastric pain.  She is in a committed monogamous   relationship and would like permanent sterilization.  Subsequently, she would   like to move forward today with primary low transverse  and bilateral   salpingectomy.    ASSESSMENT:  1. At this time is pregnancy at 36 and 1/7th weeks.  2. Posterior placenta with partial previa.  3. Undesired fertility and multiparity, desires permanent sterilization.  4. Status post betamethasone course.  5. Status post magnesium tocolysis.    PLAN:  At this time, patient has been extensively counseled on risks,   benefits, complications, and alternatives to surgery, which include but are   not limited to risk of anesthesia, risk of injury to bowel, bladder, ureters,   major  vessels, nerves, pelvis, risk of blood clots in her legs and lungs and   subsequent postop pneumonia.  She accepts these risks.  She also realizes that   bilateral salpingectomy is permanent method of birth control and there are   alternative methods of birth control including but not limited to pills,   patches, IUDs, Depo-Provera, vasectomy for her spouse.  She, however, believes   that bilateral salpingectomy is best method of birth control for her.  She   understands that by removing both of her tubes will hypothetically decrease   her risk of ovarian cancer in the future, but not prevent it.  She is   comfortable with this as well.  Plan subsequently is primary low transverse    and bilateral salpingectomy.             ____________________________________     MD CORONA Johnson / NTS    DD:  2020 09:28:22  DT:  2020 09:51:44    D#:  [id number]  Job#:  [id number]

## 2020-04-10 ENCOUNTER — ANESTHESIA EVENT (OUTPATIENT)
Dept: OBGYN | Facility: MEDICAL CENTER | Age: 35
End: 2020-04-10
Payer: COMMERCIAL

## 2020-04-10 ENCOUNTER — ANESTHESIA (OUTPATIENT)
Dept: OBGYN | Facility: MEDICAL CENTER | Age: 35
End: 2020-04-10
Payer: COMMERCIAL

## 2020-04-10 PROBLEM — O44.00 PLACENTA PREVIA: Status: ACTIVE | Noted: 2020-04-10

## 2020-04-10 LAB
ERYTHROCYTE [DISTWIDTH] IN BLOOD BY AUTOMATED COUNT: 51.3 FL (ref 35.9–50)
ERYTHROCYTE [DISTWIDTH] IN BLOOD BY AUTOMATED COUNT: 53.8 FL (ref 35.9–50)
HCT VFR BLD AUTO: 32.2 % (ref 37–47)
HCT VFR BLD AUTO: 36.5 % (ref 37–47)
HGB BLD-MCNC: 10.3 G/DL (ref 12–16)
HGB BLD-MCNC: 11.4 G/DL (ref 12–16)
MCH RBC QN AUTO: 29.5 PG (ref 27–33)
MCH RBC QN AUTO: 29.5 PG (ref 27–33)
MCHC RBC AUTO-ENTMCNC: 31.2 G/DL (ref 33.6–35)
MCHC RBC AUTO-ENTMCNC: 32 G/DL (ref 33.6–35)
MCV RBC AUTO: 92.3 FL (ref 81.4–97.8)
MCV RBC AUTO: 94.6 FL (ref 81.4–97.8)
PATHOLOGY CONSULT NOTE: NORMAL
PLATELET # BLD AUTO: 249 K/UL (ref 164–446)
PLATELET # BLD AUTO: 264 K/UL (ref 164–446)
PMV BLD AUTO: 9.4 FL (ref 9–12.9)
PMV BLD AUTO: 9.9 FL (ref 9–12.9)
RBC # BLD AUTO: 3.49 M/UL (ref 4.2–5.4)
RBC # BLD AUTO: 3.86 M/UL (ref 4.2–5.4)
WBC # BLD AUTO: 13.4 K/UL (ref 4.8–10.8)
WBC # BLD AUTO: 9.4 K/UL (ref 4.8–10.8)

## 2020-04-10 PROCEDURE — 700105 HCHG RX REV CODE 258: Performed by: ANESTHESIOLOGY

## 2020-04-10 PROCEDURE — 88302 TISSUE EXAM BY PATHOLOGIST: CPT

## 2020-04-10 PROCEDURE — 700102 HCHG RX REV CODE 250 W/ 637 OVERRIDE(OP): Performed by: OBSTETRICS & GYNECOLOGY

## 2020-04-10 PROCEDURE — 10D17Z9 MANUAL EXTRACTION OF PRODUCTS OF CONCEPTION, RETAINED, VIA NATURAL OR ARTIFICIAL OPENING: ICD-10-PCS | Performed by: OBSTETRICS & GYNECOLOGY

## 2020-04-10 PROCEDURE — 700101 HCHG RX REV CODE 250: Performed by: ANESTHESIOLOGY

## 2020-04-10 PROCEDURE — 700111 HCHG RX REV CODE 636 W/ 250 OVERRIDE (IP): Performed by: ANESTHESIOLOGY

## 2020-04-10 PROCEDURE — 700111 HCHG RX REV CODE 636 W/ 250 OVERRIDE (IP): Performed by: OBSTETRICS & GYNECOLOGY

## 2020-04-10 PROCEDURE — A9270 NON-COVERED ITEM OR SERVICE: HCPCS | Performed by: OBSTETRICS & GYNECOLOGY

## 2020-04-10 PROCEDURE — 306288 HCHG RETRACTOR C SECTION LG

## 2020-04-10 PROCEDURE — 36415 COLL VENOUS BLD VENIPUNCTURE: CPT

## 2020-04-10 PROCEDURE — A9270 NON-COVERED ITEM OR SERVICE: HCPCS | Performed by: ANESTHESIOLOGY

## 2020-04-10 PROCEDURE — 0UT70ZZ RESECTION OF BILATERAL FALLOPIAN TUBES, OPEN APPROACH: ICD-10-PCS | Performed by: OBSTETRICS & GYNECOLOGY

## 2020-04-10 PROCEDURE — 304964 HCHG RECOVERY ROOM TIME 1HR: Performed by: OBSTETRICS & GYNECOLOGY

## 2020-04-10 PROCEDURE — 306828 HCHG ANES-TIME GENERAL: Performed by: OBSTETRICS & GYNECOLOGY

## 2020-04-10 PROCEDURE — 85027 COMPLETE CBC AUTOMATED: CPT

## 2020-04-10 PROCEDURE — 700102 HCHG RX REV CODE 250 W/ 637 OVERRIDE(OP): Performed by: ANESTHESIOLOGY

## 2020-04-10 PROCEDURE — 503052 HCHG HEMOSTAT POWDER-5GRAM

## 2020-04-10 PROCEDURE — 770002 HCHG ROOM/CARE - OB PRIVATE (112)

## 2020-04-10 PROCEDURE — 0UDB7ZZ EXTRACTION OF ENDOMETRIUM, VIA NATURAL OR ARTIFICIAL OPENING: ICD-10-PCS | Performed by: OBSTETRICS & GYNECOLOGY

## 2020-04-10 PROCEDURE — 59514 CESAREAN DELIVERY ONLY: CPT

## 2020-04-10 PROCEDURE — 305385 HCHG SURGICAL SERVICES 1/4 HOUR: Performed by: OBSTETRICS & GYNECOLOGY

## 2020-04-10 PROCEDURE — A6212 FOAM DRG <=16 SQ IN W/BORDER: HCPCS

## 2020-04-10 PROCEDURE — 304966 HCHG RECOVERY SVSC TIME ADDL 1/2 HR: Performed by: OBSTETRICS & GYNECOLOGY

## 2020-04-10 RX ORDER — HYDROMORPHONE HYDROCHLORIDE 1 MG/ML
0.2 INJECTION, SOLUTION INTRAMUSCULAR; INTRAVENOUS; SUBCUTANEOUS
Status: DISCONTINUED | OUTPATIENT
Start: 2020-04-10 | End: 2020-04-10 | Stop reason: HOSPADM

## 2020-04-10 RX ORDER — HALOPERIDOL 5 MG/ML
1 INJECTION INTRAMUSCULAR
Status: DISCONTINUED | OUTPATIENT
Start: 2020-04-10 | End: 2020-04-10 | Stop reason: HOSPADM

## 2020-04-10 RX ORDER — OXYCODONE HYDROCHLORIDE 5 MG/1
5 TABLET ORAL EVERY 4 HOURS PRN
Status: DISCONTINUED | OUTPATIENT
Start: 2020-04-10 | End: 2020-04-11

## 2020-04-10 RX ORDER — ONDANSETRON 2 MG/ML
INJECTION INTRAMUSCULAR; INTRAVENOUS PRN
Status: DISCONTINUED | OUTPATIENT
Start: 2020-04-10 | End: 2020-04-10 | Stop reason: SURG

## 2020-04-10 RX ORDER — SODIUM CHLORIDE, SODIUM GLUCONATE, SODIUM ACETATE, POTASSIUM CHLORIDE AND MAGNESIUM CHLORIDE 526; 502; 368; 37; 30 MG/100ML; MG/100ML; MG/100ML; MG/100ML; MG/100ML
1500 INJECTION, SOLUTION INTRAVENOUS ONCE
Status: COMPLETED | OUTPATIENT
Start: 2020-04-10 | End: 2020-04-10

## 2020-04-10 RX ORDER — OXYCODONE HYDROCHLORIDE 10 MG/1
10 TABLET ORAL EVERY 4 HOURS PRN
Status: DISCONTINUED | OUTPATIENT
Start: 2020-04-10 | End: 2020-04-11

## 2020-04-10 RX ORDER — HYDROMORPHONE HYDROCHLORIDE 1 MG/ML
0.2 INJECTION, SOLUTION INTRAMUSCULAR; INTRAVENOUS; SUBCUTANEOUS
Status: DISCONTINUED | OUTPATIENT
Start: 2020-04-10 | End: 2020-04-11

## 2020-04-10 RX ORDER — LABETALOL HYDROCHLORIDE 5 MG/ML
5 INJECTION, SOLUTION INTRAVENOUS
Status: DISCONTINUED | OUTPATIENT
Start: 2020-04-10 | End: 2020-04-10 | Stop reason: HOSPADM

## 2020-04-10 RX ORDER — MIDAZOLAM HYDROCHLORIDE 1 MG/ML
1 INJECTION INTRAMUSCULAR; INTRAVENOUS
Status: DISCONTINUED | OUTPATIENT
Start: 2020-04-10 | End: 2020-04-10 | Stop reason: HOSPADM

## 2020-04-10 RX ORDER — SODIUM CHLORIDE, SODIUM LACTATE, POTASSIUM CHLORIDE, CALCIUM CHLORIDE 600; 310; 30; 20 MG/100ML; MG/100ML; MG/100ML; MG/100ML
INJECTION, SOLUTION INTRAVENOUS CONTINUOUS
Status: DISCONTINUED | OUTPATIENT
Start: 2020-04-10 | End: 2020-04-10 | Stop reason: HOSPADM

## 2020-04-10 RX ORDER — KETOROLAC TROMETHAMINE 30 MG/ML
30 INJECTION, SOLUTION INTRAMUSCULAR; INTRAVENOUS EVERY 6 HOURS
Status: DISPENSED | OUTPATIENT
Start: 2020-04-10 | End: 2020-04-11

## 2020-04-10 RX ORDER — OXYCODONE HCL 5 MG/5 ML
10 SOLUTION, ORAL ORAL
Status: DISCONTINUED | OUTPATIENT
Start: 2020-04-10 | End: 2020-04-10 | Stop reason: HOSPADM

## 2020-04-10 RX ORDER — BUPIVACAINE HYDROCHLORIDE 7.5 MG/ML
INJECTION, SOLUTION INTRASPINAL
Status: COMPLETED | OUTPATIENT
Start: 2020-04-10 | End: 2020-04-10

## 2020-04-10 RX ORDER — HYDROMORPHONE HYDROCHLORIDE 1 MG/ML
0.4 INJECTION, SOLUTION INTRAMUSCULAR; INTRAVENOUS; SUBCUTANEOUS
Status: DISCONTINUED | OUTPATIENT
Start: 2020-04-10 | End: 2020-04-11

## 2020-04-10 RX ORDER — SIMETHICONE 80 MG
80 TABLET,CHEWABLE ORAL 4 TIMES DAILY PRN
Status: DISCONTINUED | OUTPATIENT
Start: 2020-04-10 | End: 2020-04-14 | Stop reason: HOSPADM

## 2020-04-10 RX ORDER — HYDRALAZINE HYDROCHLORIDE 20 MG/ML
5 INJECTION INTRAMUSCULAR; INTRAVENOUS
Status: DISCONTINUED | OUTPATIENT
Start: 2020-04-10 | End: 2020-04-10 | Stop reason: HOSPADM

## 2020-04-10 RX ORDER — OXYCODONE HCL 5 MG/5 ML
5 SOLUTION, ORAL ORAL
Status: DISCONTINUED | OUTPATIENT
Start: 2020-04-10 | End: 2020-04-10 | Stop reason: HOSPADM

## 2020-04-10 RX ORDER — DOCUSATE SODIUM 100 MG/1
100 CAPSULE, LIQUID FILLED ORAL 2 TIMES DAILY PRN
Status: DISCONTINUED | OUTPATIENT
Start: 2020-04-10 | End: 2020-04-14 | Stop reason: HOSPADM

## 2020-04-10 RX ORDER — HYDROMORPHONE HYDROCHLORIDE 1 MG/ML
0.4 INJECTION, SOLUTION INTRAMUSCULAR; INTRAVENOUS; SUBCUTANEOUS
Status: DISCONTINUED | OUTPATIENT
Start: 2020-04-10 | End: 2020-04-10 | Stop reason: HOSPADM

## 2020-04-10 RX ORDER — PHENYLEPHRINE HYDROCHLORIDE 10 MG/ML
INJECTION, SOLUTION INTRAMUSCULAR; INTRAVENOUS; SUBCUTANEOUS PRN
Status: DISCONTINUED | OUTPATIENT
Start: 2020-04-10 | End: 2020-04-10 | Stop reason: SURG

## 2020-04-10 RX ORDER — DIPHENHYDRAMINE HYDROCHLORIDE 50 MG/ML
12.5 INJECTION INTRAMUSCULAR; INTRAVENOUS
Status: DISCONTINUED | OUTPATIENT
Start: 2020-04-10 | End: 2020-04-10 | Stop reason: HOSPADM

## 2020-04-10 RX ORDER — ONDANSETRON 2 MG/ML
4 INJECTION INTRAMUSCULAR; INTRAVENOUS
Status: DISCONTINUED | OUTPATIENT
Start: 2020-04-10 | End: 2020-04-10 | Stop reason: HOSPADM

## 2020-04-10 RX ORDER — MEPERIDINE HYDROCHLORIDE 25 MG/ML
12.5 INJECTION INTRAMUSCULAR; INTRAVENOUS; SUBCUTANEOUS
Status: DISCONTINUED | OUTPATIENT
Start: 2020-04-10 | End: 2020-04-10 | Stop reason: HOSPADM

## 2020-04-10 RX ORDER — SODIUM CHLORIDE, SODIUM LACTATE, POTASSIUM CHLORIDE, CALCIUM CHLORIDE 600; 310; 30; 20 MG/100ML; MG/100ML; MG/100ML; MG/100ML
INJECTION, SOLUTION INTRAVENOUS PRN
Status: DISCONTINUED | OUTPATIENT
Start: 2020-04-10 | End: 2020-04-14 | Stop reason: HOSPADM

## 2020-04-10 RX ORDER — CEFAZOLIN SODIUM 1 G/3ML
INJECTION, POWDER, FOR SOLUTION INTRAMUSCULAR; INTRAVENOUS PRN
Status: DISCONTINUED | OUTPATIENT
Start: 2020-04-10 | End: 2020-04-10 | Stop reason: SURG

## 2020-04-10 RX ORDER — SODIUM CHLORIDE, SODIUM GLUCONATE, SODIUM ACETATE, POTASSIUM CHLORIDE AND MAGNESIUM CHLORIDE 526; 502; 368; 37; 30 MG/100ML; MG/100ML; MG/100ML; MG/100ML; MG/100ML
INJECTION, SOLUTION INTRAVENOUS
Status: DISCONTINUED | OUTPATIENT
Start: 2020-04-10 | End: 2020-04-10 | Stop reason: SURG

## 2020-04-10 RX ORDER — ONDANSETRON 2 MG/ML
4 INJECTION INTRAMUSCULAR; INTRAVENOUS EVERY 6 HOURS PRN
Status: DISCONTINUED | OUTPATIENT
Start: 2020-04-10 | End: 2020-04-11

## 2020-04-10 RX ORDER — MISOPROSTOL 200 UG/1
600 TABLET ORAL
Status: DISCONTINUED | OUTPATIENT
Start: 2020-04-10 | End: 2020-04-14 | Stop reason: HOSPADM

## 2020-04-10 RX ORDER — CITRIC ACID/SODIUM CITRATE 334-500MG
30 SOLUTION, ORAL ORAL ONCE
Status: COMPLETED | OUTPATIENT
Start: 2020-04-10 | End: 2020-04-10

## 2020-04-10 RX ORDER — DIPHENHYDRAMINE HYDROCHLORIDE 50 MG/ML
25 INJECTION INTRAMUSCULAR; INTRAVENOUS EVERY 6 HOURS PRN
Status: DISCONTINUED | OUTPATIENT
Start: 2020-04-10 | End: 2020-04-11

## 2020-04-10 RX ORDER — ACETAMINOPHEN 500 MG
1000 TABLET ORAL EVERY 6 HOURS
Status: DISPENSED | OUTPATIENT
Start: 2020-04-10 | End: 2020-04-11

## 2020-04-10 RX ORDER — IPRATROPIUM BROMIDE AND ALBUTEROL SULFATE 2.5; .5 MG/3ML; MG/3ML
3 SOLUTION RESPIRATORY (INHALATION)
Status: DISCONTINUED | OUTPATIENT
Start: 2020-04-10 | End: 2020-04-10 | Stop reason: HOSPADM

## 2020-04-10 RX ORDER — DEXAMETHASONE SODIUM PHOSPHATE 4 MG/ML
INJECTION, SOLUTION INTRA-ARTICULAR; INTRALESIONAL; INTRAMUSCULAR; INTRAVENOUS; SOFT TISSUE PRN
Status: DISCONTINUED | OUTPATIENT
Start: 2020-04-10 | End: 2020-04-10 | Stop reason: SURG

## 2020-04-10 RX ORDER — SODIUM CHLORIDE, SODIUM LACTATE, POTASSIUM CHLORIDE, CALCIUM CHLORIDE 600; 310; 30; 20 MG/100ML; MG/100ML; MG/100ML; MG/100ML
INJECTION, SOLUTION INTRAVENOUS CONTINUOUS
Status: DISCONTINUED | OUTPATIENT
Start: 2020-04-10 | End: 2020-04-14 | Stop reason: HOSPADM

## 2020-04-10 RX ORDER — DIPHENHYDRAMINE HYDROCHLORIDE 50 MG/ML
12.5 INJECTION INTRAMUSCULAR; INTRAVENOUS EVERY 6 HOURS PRN
Status: DISCONTINUED | OUTPATIENT
Start: 2020-04-10 | End: 2020-04-11

## 2020-04-10 RX ORDER — KETAMINE HYDROCHLORIDE 50 MG/ML
INJECTION, SOLUTION INTRAMUSCULAR; INTRAVENOUS PRN
Status: DISCONTINUED | OUTPATIENT
Start: 2020-04-10 | End: 2020-04-10 | Stop reason: SURG

## 2020-04-10 RX ORDER — MORPHINE SULFATE 0.5 MG/ML
INJECTION, SOLUTION EPIDURAL; INTRATHECAL; INTRAVENOUS
Status: COMPLETED | OUTPATIENT
Start: 2020-04-10 | End: 2020-04-10

## 2020-04-10 RX ORDER — OXYTOCIN 10 [USP'U]/ML
INJECTION, SOLUTION INTRAMUSCULAR; INTRAVENOUS PRN
Status: DISCONTINUED | OUTPATIENT
Start: 2020-04-10 | End: 2020-04-10 | Stop reason: SURG

## 2020-04-10 RX ORDER — VITAMIN A ACETATE, BETA CAROTENE, ASCORBIC ACID, CHOLECALCIFEROL, .ALPHA.-TOCOPHEROL ACETATE, DL-, THIAMINE MONONITRATE, RIBOFLAVIN, NIACINAMIDE, PYRIDOXINE HYDROCHLORIDE, FOLIC ACID, CYANOCOBALAMIN, CALCIUM CARBONATE, FERROUS FUMARATE, ZINC OXIDE, CUPRIC OXIDE 3080; 12; 120; 400; 1; 1.84; 3; 20; 22; 920; 25; 200; 27; 10; 2 [IU]/1; UG/1; MG/1; [IU]/1; MG/1; MG/1; MG/1; MG/1; MG/1; [IU]/1; MG/1; MG/1; MG/1; MG/1; MG/1
1 TABLET, FILM COATED ORAL EVERY MORNING
Status: DISCONTINUED | OUTPATIENT
Start: 2020-04-10 | End: 2020-04-14 | Stop reason: HOSPADM

## 2020-04-10 RX ORDER — HYDROMORPHONE HYDROCHLORIDE 1 MG/ML
0.1 INJECTION, SOLUTION INTRAMUSCULAR; INTRAVENOUS; SUBCUTANEOUS
Status: DISCONTINUED | OUTPATIENT
Start: 2020-04-10 | End: 2020-04-10 | Stop reason: HOSPADM

## 2020-04-10 RX ORDER — METOCLOPRAMIDE HYDROCHLORIDE 5 MG/ML
10 INJECTION INTRAMUSCULAR; INTRAVENOUS ONCE
Status: COMPLETED | OUTPATIENT
Start: 2020-04-10 | End: 2020-04-10

## 2020-04-10 RX ORDER — KETOROLAC TROMETHAMINE 30 MG/ML
INJECTION, SOLUTION INTRAMUSCULAR; INTRAVENOUS PRN
Status: DISCONTINUED | OUTPATIENT
Start: 2020-04-10 | End: 2020-04-10 | Stop reason: SURG

## 2020-04-10 RX ADMIN — Medication 125 ML/HR: at 13:30

## 2020-04-10 RX ADMIN — SODIUM CHLORIDE, SODIUM GLUCONATE, SODIUM ACETATE, POTASSIUM CHLORIDE AND MAGNESIUM CHLORIDE: 526; 502; 368; 37; 30 INJECTION, SOLUTION INTRAVENOUS at 08:27

## 2020-04-10 RX ADMIN — SODIUM CHLORIDE, POTASSIUM CHLORIDE, SODIUM LACTATE AND CALCIUM CHLORIDE: 600; 310; 30; 20 INJECTION, SOLUTION INTRAVENOUS at 10:15

## 2020-04-10 RX ADMIN — PHENYLEPHRINE HYDROCHLORIDE 200 MCG: 10 INJECTION INTRAVENOUS at 08:38

## 2020-04-10 RX ADMIN — BUPIVACAINE HYDROCHLORIDE IN DEXTROSE 1.8 ML: 7.5 INJECTION, SOLUTION SUBARACHNOID at 08:00

## 2020-04-10 RX ADMIN — KETAMINE HYDROCHLORIDE 20 MG: 50 INJECTION INTRAMUSCULAR; INTRAVENOUS at 08:30

## 2020-04-10 RX ADMIN — SODIUM CITRATE AND CITRIC ACID MONOHYDRATE 30 ML: 500; 334 SOLUTION ORAL at 07:05

## 2020-04-10 RX ADMIN — KETOROLAC TROMETHAMINE 30 MG: 30 INJECTION, SOLUTION INTRAMUSCULAR at 21:22

## 2020-04-10 RX ADMIN — FAMOTIDINE 20 MG: 10 INJECTION INTRAVENOUS at 07:06

## 2020-04-10 RX ADMIN — METOCLOPRAMIDE 10 MG: 5 INJECTION, SOLUTION INTRAMUSCULAR; INTRAVENOUS at 07:06

## 2020-04-10 RX ADMIN — MIDAZOLAM HYDROCHLORIDE 2 MG: 1 INJECTION, SOLUTION INTRAMUSCULAR; INTRAVENOUS at 08:25

## 2020-04-10 RX ADMIN — PHENYLEPHRINE HYDROCHLORIDE 200 MCG: 10 INJECTION INTRAVENOUS at 08:35

## 2020-04-10 RX ADMIN — KETAMINE HYDROCHLORIDE 20 MG: 50 INJECTION INTRAMUSCULAR; INTRAVENOUS at 08:40

## 2020-04-10 RX ADMIN — SODIUM CHLORIDE, SODIUM GLUCONATE, SODIUM ACETATE, POTASSIUM CHLORIDE AND MAGNESIUM CHLORIDE: 526; 502; 368; 37; 30 INJECTION, SOLUTION INTRAVENOUS at 07:55

## 2020-04-10 RX ADMIN — KETOROLAC TROMETHAMINE 30 MG: 30 INJECTION, SOLUTION INTRAMUSCULAR at 08:53

## 2020-04-10 RX ADMIN — KETAMINE HYDROCHLORIDE 20 MG: 50 INJECTION INTRAMUSCULAR; INTRAVENOUS at 08:25

## 2020-04-10 RX ADMIN — SODIUM CHLORIDE, SODIUM GLUCONATE, SODIUM ACETATE, POTASSIUM CHLORIDE AND MAGNESIUM CHLORIDE 1000 ML: 526; 502; 368; 37; 30 INJECTION, SOLUTION INTRAVENOUS at 07:07

## 2020-04-10 RX ADMIN — KETOROLAC TROMETHAMINE 30 MG: 30 INJECTION, SOLUTION INTRAMUSCULAR at 15:22

## 2020-04-10 RX ADMIN — OXYTOCIN 20 UNITS: 10 INJECTION, SOLUTION INTRAMUSCULAR; INTRAVENOUS at 08:20

## 2020-04-10 RX ADMIN — ONDANSETRON 4 MG: 2 INJECTION INTRAMUSCULAR; INTRAVENOUS at 08:53

## 2020-04-10 RX ADMIN — ACETAMINOPHEN 1000 MG: 500 TABLET ORAL at 21:22

## 2020-04-10 RX ADMIN — CEFAZOLIN 2 G: 330 INJECTION, POWDER, FOR SOLUTION INTRAMUSCULAR; INTRAVENOUS at 08:05

## 2020-04-10 RX ADMIN — ACETAMINOPHEN 1000 MG: 500 TABLET ORAL at 15:22

## 2020-04-10 RX ADMIN — PHENYLEPHRINE HYDROCHLORIDE 200 MCG: 10 INJECTION INTRAVENOUS at 08:07

## 2020-04-10 RX ADMIN — PHENYLEPHRINE HYDROCHLORIDE 100 MCG: 10 INJECTION INTRAVENOUS at 08:27

## 2020-04-10 RX ADMIN — MORPHINE SULFATE 150 MCG: 0.5 INJECTION, SOLUTION EPIDURAL; INTRATHECAL; INTRAVENOUS at 08:00

## 2020-04-10 RX ADMIN — DEXAMETHASONE SODIUM PHOSPHATE 8 MG: 4 INJECTION, SOLUTION INTRA-ARTICULAR; INTRALESIONAL; INTRAMUSCULAR; INTRAVENOUS; SOFT TISSUE at 08:53

## 2020-04-10 RX ADMIN — VITAMIN A, VITAMIN C, VITAMIN D-3, VITAMIN E, VITAMIN B-1, VITAMIN B-2, NIACIN, VITAMIN B-6, CALCIUM, IRON, ZINC, COPPER 1 TABLET: 4000; 120; 400; 22; 1.84; 3; 20; 10; 1; 12; 200; 27; 25; 2 TABLET ORAL at 15:23

## 2020-04-10 RX ADMIN — OXYCODONE HYDROCHLORIDE 5 MG: 5 TABLET ORAL at 17:59

## 2020-04-10 RX ADMIN — PHENYLEPHRINE HYDROCHLORIDE 200 MCG: 10 INJECTION INTRAVENOUS at 08:33

## 2020-04-10 ASSESSMENT — EDINBURGH POSTNATAL DEPRESSION SCALE (EPDS)
I HAVE LOOKED FORWARD WITH ENJOYMENT TO THINGS: AS MUCH AS I EVER DID
I HAVE BLAMED MYSELF UNNECESSARILY WHEN THINGS WENT WRONG: NOT VERY OFTEN
I HAVE FELT SAD OR MISERABLE: NO, NOT AT ALL
I HAVE BEEN SO UNHAPPY THAT I HAVE HAD DIFFICULTY SLEEPING: NOT AT ALL
I HAVE BEEN SO UNHAPPY THAT I HAVE BEEN CRYING: NO, NEVER
THINGS HAVE BEEN GETTING ON TOP OF ME: NO, MOST OF THE TIME I HAVE COPED QUITE WELL
I HAVE FELT SCARED OR PANICKY FOR NO GOOD REASON: NO, NOT MUCH
THE THOUGHT OF HARMING MYSELF HAS OCCURRED TO ME: NEVER
I HAVE BEEN ANXIOUS OR WORRIED FOR NO GOOD REASON: HARDLY EVER
I HAVE BEEN ABLE TO LAUGH AND SEE THE FUNNY SIDE OF THINGS: AS MUCH AS I ALWAYS COULD

## 2020-04-10 NOTE — ANESTHESIA TIME REPORT
Anesthesia Start and Stop Event Times     Date Time Event    4/10/2020 0655 Ready for Procedure     0755 Anesthesia Start     0907 Anesthesia Stop        Responsible Staff  04/10/20    Name Role Begin End    Coleman Simmons M.D. Anesth 0755 0907        Preop Diagnosis (Free Text):  Pre-op Diagnosis     IUP 36.2 partial plecental previa         Preop Diagnosis (Codes):    Post op Diagnosis  Intrauterine pregnancy  IUP 36.2 partial plecental previa     Premium Reason  Non-Premium    Comments:

## 2020-04-10 NOTE — ANESTHESIA POSTPROCEDURE EVALUATION
Patient: Yuliana Hernández    Procedure Summary     Date:  04/10/20 Room / Location:  [facility] OR 01 / LABOR AND DELIVERY    Anesthesia Start:  755 Anesthesia Stop:  907    Procedures:        SECTION, PRIMARY      SALPINGECTOMY, OPEN (Bilateral ) Diagnosis:  (IUP 36.2 partial plecental previa : delivered)    Surgeon:  Seth Morrissey M.D. Responsible Provider:  Coleman Simmons M.D.    Anesthesia Type:  spinal ASA Status:  2          Final Anesthesia Type: spinal  Last vitals  BP   Blood Pressure: (!) 95/55    Temp   35.9 °C (96.7 °F)    Pulse   Pulse: 83   Resp   17    SpO2   99 %      Anesthesia Post Evaluation    Patient location during evaluation: PACU  Patient participation: complete - patient participated  Level of consciousness: awake and alert    Airway patency: patent  Anesthetic complications: no  Cardiovascular status: hemodynamically stable  Respiratory status: acceptable  Hydration status: euvolemic    PONV: none           Nurse Pain Score: 0 (NPRS)

## 2020-04-10 NOTE — ANESTHESIA PROCEDURE NOTES
Spinal Block  Date/Time: 4/10/2020 8:00 AM  Performed by: Coleman Simmons M.D.  Authorized by: Coleman Simmons M.D.     Patient Location:  OR  Start Time:  4/10/2020 8:00 AM  Reason for Block: primary anesthetic    patient identified, IV checked, site marked, risks and benefits discussed, surgical consent, monitors and equipment checked, pre-op evaluation and timeout performed    Patient Position:  Sitting  Prep: ChloraPrep, patient draped and sterile technique    Monitoring:  Blood pressure, continuous pulse oximetry and heart rate  Approach:  Midline  Location:  L3-4  Injection Technique:  Single-shot  Skin infiltration:  Lidocaine  Strength:  1%  Dose:  3ml  Needle Type:  Pencan  Needle Gauge:  25 G  CSF flowing pre/post injection:  Yes  Sensory Level:  T4

## 2020-04-10 NOTE — PROGRESS NOTES
1900: Report from KRIS Valencia RN. Assumed care of pt.   2020: Assessment WNL. Pt denies LOF, VB and contractions. Reports +FM. EFM and TOCO placed for NST.   2300: Pt brought snack and juice, pt educated that she will be NPO after midnight. Pt verbalized understanding.   0510: Report off to TYLER Gonzalez RN and KRIS Hansen RN.

## 2020-04-10 NOTE — PROGRESS NOTES
Received report from Suzan DHILLON. . 36.2. Patient scheduled for primary  for a partial previa and bilateral salpingectomy.  Patients questions addressed. Pre-op medications given. Consents signed. Patient transferred to OR 2.

## 2020-04-10 NOTE — PROGRESS NOTES
Labor Progress Note    Yuliana Hernández   36w1d      Subjective:  Denies complaints,. Had some brown discharge today. No bleeding or spotting. Good movement. Still feels occasional ctxns.   She is set to have her csection tomorrow am. Objective:   Vitals:    20 0352 20 0818 20 1210 20 1621   BP: (!) 95/60 104/59 111/66 104/66   Pulse: 83 93 90 (!) 102   Resp: 16 17 17 18   Temp: 37.1 °C (98.7 °F) 36.7 °C (98.1 °F) 36.6 °C (97.8 °F) 36.6 °C (97.9 °F)   TempSrc: Temporal Temporal Temporal Temporal   SpO2:       Weight:       Height:         Gen: no acute distress  Abd: gravid NT  Ext: no calf pain letty LE    NST: Category 1, mod variability, one mild non-repetitive variable.  [place]: occasional.     Labs:  No results found for this or any previous visit (from the past 24 hour(s)).    Assessment: plan  36w1d    Placenta previa - stable, no vaginal bleeding, no signs of labor at this time, for primary  tomorrow with Dr. Morrissey or sooner with vaginal bleeding or labor, continue antepartum management, NSTs Q shift  S/p BMZ x 2 3/26 & 3/27  Anemia s/p iron infusion - H/H 10..3  Undesired fertility - desires permanent sterilization, for bilateral salpingectomy with          Meena Shearer M.D.

## 2020-04-10 NOTE — ANESTHESIA PREPROCEDURE EVALUATION
Relevant Problems   PULMONARY   (+) Asthma exacerbation   (+) Uncomplicated asthma      Other   (+) Placenta previa       Physical Exam    Airway   Mallampati: II  TM distance: >3 FB  Neck ROM: full       Cardiovascular - normal exam  Rhythm: regular  Rate: normal  (-) murmur     Dental - normal exam         Pulmonary - normal exam  Breath sounds clear to auscultation     Abdominal    Neurological - normal exam                 Anesthesia Plan    ASA 2       Plan - spinal   Neuraxial block will be primary anesthetic            Postoperative Plan: Postoperative administration of opioids is intended.    Pertinent diagnostic labs and testing reviewed    Informed Consent:    Anesthetic plan and risks discussed with patient.

## 2020-04-10 NOTE — PROGRESS NOTES
Bedside report received from Renetta (L&D RN); patient oriented to room including emergency/regular use of call light, when to call RN, and plan of care for the rest of the shift. Fundus firm at U with moderate bleeding, oxytocin running at 125 ml/hr and will continue to monitor; MOB put on a pumping schedule d/t infant being in NICU

## 2020-04-10 NOTE — OR SURGEON
Immediate Post OP Note    PreOp Diagnosis: iup at 36.1; posterior previa; undesired fertility    PostOp Diagnosis: same with small focal acreta    Procedure(s):   SECTION, PRIMARY  SALPINGECTOMY, OPEN    Surgeon(s):  JAYCEE Vivas M.D.    Anesthesiologist/Type of Anesthesia:  Anesthesiologist: Coleman Simmons M.D./* No anesthesia type entered *    Surgical Staff:  Circulator: Jackie Gonzalez R.N.  Scrub Person: Rachel GONZALEZ Circulator Assistant: Regine Hansen R.N.    Specimens removed if any:  * No specimens in log *    Estimated Blood Loss: 800    Findings: posterior previs; partial acreta    Complications: small foall acreta; D&E        4/10/2020 9:02 AM Seth Morrissey M.D.

## 2020-04-10 NOTE — OP REPORT
DATE OF SERVICE:  04/10/2020    PREOPERATIVE DIAGNOSES:  1.  Pregnancy at 36 and 1/7th weeks.  2.  Posterior previa.  3.  Undesired fertility, multiparity, desires permanent sterilization.    POSTOPERATIVE DIAGNOSES:  1.  Pregnancy at 36 and 1/7th weeks.  2.  Posterior previa.  3.  Undesired fertility, multiparity, desires permanent sterilization with   small focal posterior accreta.    PROCEDURE:  Primary low transverse , bilateral salpingectomy and D and   C.    SURGEON:  Seth Sheffield MD    ASSIST:  Charli Davey MD    ANESTHESIOLOGIST:  Coleman Simmons MD    ANESTHESIA:  Spinal.    SPECIMENS:  Cord gases.    ESTIMATED BLOOD LOSS:  800 mL    FINDINGS:  Posterior previa with a small focal accreta.  Loose nuchal cord.    COMPLICATIONS:  None.    DISPOSITION:  Stable.    PROCEDURE IN DETAIL:  After informed consent was obtained, the patient was   taken to the operating room where spinal anesthesia was found be adequate.    She was then prepped and draped in normal sterile fashion in dorsal supine   position with leftward tilt.  Pfannenstiel skin incision was made with a   scalpel, carried down to the underlying layer of fascia with the Bovie.  The   fascia was then nicked in the midline and the fascial incision was extended   laterally with the Bovie and Vale scissors.  The inferior layer of fascial   incision was then grasped with 2 Kocher clamps, tented up, and the underlying   layer of rectus muscles were dissected off with the Bovie.  Attention was then   turned to the superior layer of fascial incision, which in a similar fashion   was grasped with 2 Kocher clamps, tented up, and underlying layer of rectus   muscle dissected off with the Bovie.  The rectus muscles were  in the   midline.  The peritoneum identified, tented up and entered sharply with   Metzenbaums.  Peritoneal incision was extended superiorly and inferiorly with   good visualization of the bladder.  At this point, an Yoseph byrd  placed.    Bowel was swept away.  The Yoseph O was secured.  A bladder flap was created   with the Metzenbaums.  The lower uterine segment was well developed.  The   uterine incision was made with a scalpel in a low U-shaped fashion.  The   uterine incision was extended laterally bluntly.  Clear fluid was noted.  The   head was delivered atraumatically.  Nares and mouth were bulb suctioned.  A   loose nuchal cord was reduced.  The shoulders and body followed without   complication.  After delay, the cord was clamped and cut.  Cord gases were   collected and sent.  The infant was handed off to the waiting nursing team.    At this point, we attempted to deliver the placenta by gentle fundal pressure.    This was not achieved.  Subsequently, the uterus was delivered.  Fundus was   wrapped in a moist lap and the placenta was stabilized also with a dry lap,   and we were able to massage out the placenta.  There was a posterior previa   and there was a small focal area of accreta posteriorly.  Once the placenta   was removed, the uterus was curettaged with a large banjo curette, especially   at the area where the accreta was, which appeared to be normal endometrium   after curettage.  The uterus at this point firmed up nicely.  The uterus was   replaced in the pelvis.  Uterine incision was closed with 0 chromic in a   running locking fashion.  Second layer of same stitch was used to obtain   excellent hemostasis.  At this point, I confirmed with the patient that her   tubes were removed, which she affirmed to me.  Right tube was traced out to   its fimbriated end and then using the LigaSure, staying well away from the   course of the ureter, working from the cornua down towards the fimbria.  The   right tube was removed in its entirety.  Hemostasis was assured.  Left tube   was removed in a similar fashion.  Hemostasis was assured.  At this point, the   Yoseph O was removed.  Some Anisa was placed prophylactically.   Tubal   dissection sites were again found to be hemostatic in situ.  The peritoneum   was closed with a running stitch of 2-0 Vicryl.  The rectus muscles were   reapproximated with simple stitch of the same.  The fascia was closed with 0   Vicryl in a running fashion.  Irrigation was performed and hemostasis was   assured.  Subcutaneous fat was run with a running stitch of 3-0 Vicryl.  The   skin was closed with 4-0 Vicryl in a subcuticular stitch.  The patient   tolerated the procedure well.  Sponge, lap and needle counts were correct x3.    Patient was taken to recovery in stable condition.  There were no   complications.       ____________________________________     MD CORONA Johnson / DAYNA    DD:  04/10/2020 10:03:22  DT:  04/10/2020 11:13:04    D#:  [id number]  Job#:  [id number]

## 2020-04-10 NOTE — CARE PLAN
Problem: Venous Thromboembolism (VTW)/Deep Vein Thrombosis (DVT) Prevention:  Goal: Patient will participate in Venous Thrombosis (VTE)/Deep Vein Thrombosis (DVT)Prevention Measures  Outcome: PROGRESSING AS EXPECTED  Flowsheets  Taken 4/10/2020 1200 by Tesha Sanchez RPJ  Risk Assessment Score: 2  VTE RISK: Moderate  Mechanical Prophylaxis: SCDs, Sequential Compression Device  SCDs, Sequential Compression Device: On  Taken 4/9/2020 2020 by Latisha Aguilera RBabsNBabs  Pharmacologic Prophylaxis Used: Not Appropriate for Age  Note: SCD's applied     Problem: Bowel/Gastric:  Goal: Normal bowel function is maintained or improved  Outcome: PROGRESSING AS EXPECTED  Note: Tolerating clear liquids at this time

## 2020-04-10 NOTE — ANESTHESIA QCDR
2019 [facility] Clinical Data Registry (for Quality Improvement)     Postoperative nausea/vomiting risk protocol (Adult = 18 yrs and Pediatric 3-17 yrs)- (430 and 463)  General inhalation anesthetic (NOT TIVA) with PONV risk factors: No  Provision of anti-emetic therapy with at least 2 different classes of agents: N/A  Patient DID NOT receive anti-emetic therapy and reason is documented in Medical Record: N/A    Multimodal Pain Management- (477)  Non-emergent surgery AND patient age >= 18: Yes  Use of Multimodal Pain Management, two or more drugs and/or interventions, NOT including systemic opioids: Yes  Exception: Documented allergy to multiple classes of analgesics: N/A    Smoking Abstinence (404)  Patient is current smoker (cigarette, pipe, e-cig, marijuanna): No  Elective Surgery:   Abstinence instructions provided prior to day of surgery:   Patient abstained from smoking on day of surgery:     Pre-Op Beta-Blocker in Isolated CABG (44)  Isolated CABG AND patient age >= 18: No  Beta-blocker admin within 24 hours of surgical incision:   Exception:of medical reason(s) for not administering beta blocker within 24 hours prior to surgical incision (e.g., not  indicated,other medical reason):     PACU assessment of acute postoperative pain prior to Anesthesia Care End- Applies to Patients Age = 18- (ABG7)  Initial PACU pain score is which of the following: < 7/10  Patient unable to report pain score: N/A    Post-anesthetic transfer of care checklist/protocol to PACU/ICU- (426 and 427)  Upon conclusion of case, patient transferred to which of the following locations: PACU/Non-ICU  Use of transfer checklist/protocol: Yes  Exclusion: Service Performed in Patient Hospital Room (and thus did not require transfer): N/A  Unplanned admission to ICU related to anesthesia service up through end of PACU care- (MD51)  Unplanned admission to ICU (not initially anticipated at anesthesia start time): No

## 2020-04-11 PROCEDURE — 770002 HCHG ROOM/CARE - OB PRIVATE (112)

## 2020-04-11 PROCEDURE — A9270 NON-COVERED ITEM OR SERVICE: HCPCS | Performed by: OBSTETRICS & GYNECOLOGY

## 2020-04-11 PROCEDURE — 700111 HCHG RX REV CODE 636 W/ 250 OVERRIDE (IP): Performed by: ANESTHESIOLOGY

## 2020-04-11 PROCEDURE — A9270 NON-COVERED ITEM OR SERVICE: HCPCS | Performed by: ANESTHESIOLOGY

## 2020-04-11 PROCEDURE — 700112 HCHG RX REV CODE 229: Performed by: OBSTETRICS & GYNECOLOGY

## 2020-04-11 PROCEDURE — 700102 HCHG RX REV CODE 250 W/ 637 OVERRIDE(OP): Performed by: ANESTHESIOLOGY

## 2020-04-11 PROCEDURE — 700102 HCHG RX REV CODE 250 W/ 637 OVERRIDE(OP): Performed by: OBSTETRICS & GYNECOLOGY

## 2020-04-11 RX ORDER — OXYCODONE AND ACETAMINOPHEN 10; 325 MG/1; MG/1
1 TABLET ORAL EVERY 4 HOURS PRN
Status: DISCONTINUED | OUTPATIENT
Start: 2020-04-11 | End: 2020-04-14 | Stop reason: HOSPADM

## 2020-04-11 RX ORDER — KETOROLAC TROMETHAMINE 30 MG/ML
30 INJECTION, SOLUTION INTRAMUSCULAR; INTRAVENOUS EVERY 6 HOURS
Status: DISCONTINUED | OUTPATIENT
Start: 2020-04-11 | End: 2020-04-11

## 2020-04-11 RX ORDER — ACETAMINOPHEN 325 MG/1
325 TABLET ORAL EVERY 4 HOURS PRN
Status: DISCONTINUED | OUTPATIENT
Start: 2020-04-11 | End: 2020-04-14 | Stop reason: HOSPADM

## 2020-04-11 RX ORDER — MORPHINE SULFATE 4 MG/ML
4 INJECTION, SOLUTION INTRAMUSCULAR; INTRAVENOUS
Status: DISCONTINUED | OUTPATIENT
Start: 2020-04-11 | End: 2020-04-14 | Stop reason: HOSPADM

## 2020-04-11 RX ORDER — ONDANSETRON 2 MG/ML
4 INJECTION INTRAMUSCULAR; INTRAVENOUS EVERY 6 HOURS PRN
Status: DISCONTINUED | OUTPATIENT
Start: 2020-04-11 | End: 2020-04-14 | Stop reason: HOSPADM

## 2020-04-11 RX ORDER — ONDANSETRON 4 MG/1
4 TABLET, ORALLY DISINTEGRATING ORAL EVERY 6 HOURS PRN
Status: DISCONTINUED | OUTPATIENT
Start: 2020-04-11 | End: 2020-04-14 | Stop reason: HOSPADM

## 2020-04-11 RX ORDER — IBUPROFEN 600 MG/1
600 TABLET ORAL EVERY 6 HOURS PRN
Status: DISCONTINUED | OUTPATIENT
Start: 2020-04-11 | End: 2020-04-14 | Stop reason: HOSPADM

## 2020-04-11 RX ORDER — OXYCODONE HYDROCHLORIDE AND ACETAMINOPHEN 5; 325 MG/1; MG/1
1 TABLET ORAL EVERY 4 HOURS PRN
Status: DISCONTINUED | OUTPATIENT
Start: 2020-04-11 | End: 2020-04-14 | Stop reason: HOSPADM

## 2020-04-11 RX ADMIN — OXYCODONE HYDROCHLORIDE AND ACETAMINOPHEN 1 TABLET: 10; 325 TABLET ORAL at 16:35

## 2020-04-11 RX ADMIN — VITAMIN A, VITAMIN C, VITAMIN D-3, VITAMIN E, VITAMIN B-1, VITAMIN B-2, NIACIN, VITAMIN B-6, CALCIUM, IRON, ZINC, COPPER 1 TABLET: 4000; 120; 400; 22; 1.84; 3; 20; 10; 1; 12; 200; 27; 25; 2 TABLET ORAL at 05:45

## 2020-04-11 RX ADMIN — SIMETHICONE CHEW TAB 80 MG 80 MG: 80 TABLET ORAL at 01:08

## 2020-04-11 RX ADMIN — KETOROLAC TROMETHAMINE 30 MG: 30 INJECTION, SOLUTION INTRAMUSCULAR at 03:17

## 2020-04-11 RX ADMIN — IBUPROFEN 600 MG: 600 TABLET ORAL at 17:30

## 2020-04-11 RX ADMIN — OXYCODONE HYDROCHLORIDE 5 MG: 5 TABLET ORAL at 08:12

## 2020-04-11 RX ADMIN — OXYCODONE HYDROCHLORIDE AND ACETAMINOPHEN 1 TABLET: 10; 325 TABLET ORAL at 12:19

## 2020-04-11 RX ADMIN — IBUPROFEN 600 MG: 600 TABLET ORAL at 11:34

## 2020-04-11 RX ADMIN — ACETAMINOPHEN 1000 MG: 500 TABLET ORAL at 03:17

## 2020-04-11 RX ADMIN — OXYCODONE HYDROCHLORIDE AND ACETAMINOPHEN 1 TABLET: 10; 325 TABLET ORAL at 20:40

## 2020-04-11 RX ADMIN — DOCUSATE SODIUM 100 MG: 100 CAPSULE, LIQUID FILLED ORAL at 11:32

## 2020-04-11 RX ADMIN — OXYCODONE HYDROCHLORIDE 5 MG: 5 TABLET ORAL at 01:08

## 2020-04-11 RX ADMIN — SIMETHICONE CHEW TAB 80 MG 80 MG: 80 TABLET ORAL at 17:30

## 2020-04-11 RX ADMIN — SIMETHICONE CHEW TAB 80 MG 80 MG: 80 TABLET ORAL at 11:32

## 2020-04-11 NOTE — PROGRESS NOTES
Hospital Day : 15    S: doing well; lashaun diet; min bleed; bfeed    O:  Vitals:    04/11/20 0000 04/11/20 0108 04/11/20 0200 04/11/20 0600   BP:   (!) 92/52 101/54   Pulse: 64  81 73   Resp: 18 18 17 18   Temp:   36.2 °C (97.2 °F) 36.2 °C (97.2 °F)   TempSrc:   Temporal Temporal   SpO2: 95%  95% 97%   Weight:       Height:           Recent Labs     04/10/20  0536 04/10/20  1710   WBC 9.4 13.4*   RBC 3.86* 3.49*   HEMOGLOBIN 11.4* 10.3*   HEMATOCRIT 36.5* 32.2*   MCV 94.6 92.3   MCH 29.5 29.5   MCHC 31.2* 32.0*   RDW 53.8* 51.3*   PLATELETCT 264 249   MPV 9.9 9.4             abd soft; cdi    A: pod 1 sp 1ltcs/bs; post previa with prob small focal acreta; doing well    P: cpm

## 2020-04-11 NOTE — PROGRESS NOTES
2000 Assessment completed, fundus firm, lochia light, ABD incision with mepilex dressing intact, POC reviewed, verbalized understanding. Denies pain at this time, will call if pain med intervention needed.

## 2020-04-11 NOTE — LACTATION NOTE
This note was copied from a baby's chart.  Evaluated breast pump use to include frequency, duration, suction and speed settings as well as flange fit.Discussed massage techniques and single side vs. Double pumping techniques discussed. Larger flanges provided. Labelling bottles and storage containers. Progression to breastfeeding.

## 2020-04-11 NOTE — CARE PLAN
Problem: Altered physiologic condition related to postoperative  delivery  Goal: Patient physiologically stable as evidenced by normal lochia, palpable uterine involution and vital signs within normal limits  Outcome: PROGRESSING AS EXPECTED  Note: VSS. Fundus firm. Lochia light.     Problem: Alteration in comfort related to surgical incision and/or after birth pains  Goal: Patient verbalizes acceptable pain level  Outcome: PROGRESSING AS EXPECTED  Note: Pt pain managed with PRN pain meds per MAR.

## 2020-04-11 NOTE — PROGRESS NOTES
Assessment complete. Fundus firm, lochia light. VSS. Tolerating diet. Voiding without difficulty. Incision dressing CDI. Pt states passing gas. Pain controlled with prn medications per mar. Will offer pain medications as they become available. . POC discussed with pt. Encouraged to call with needs. Call light in place.

## 2020-04-11 NOTE — CONSULTS
HG pump is in room, and MOB has been pumping and getting small amounts of colostrum/milk.     She denies any problems or pain while using pump, and says no rubbing inside the flanges. Reviewed pump settings with mother.   Encouraged to pump every 3 hours, while awake, and may have one 5 hour stretch to rest at night.   Provided her pump rental info, and encouraged to rent HG pump from [employer]. She states she has WIC, and has to contact them to get a pump. Encouraged to rent especially since she doesn't have access to a pump yet. Discussed she should continue using HG pump to help establish her supply, then she can obtain her WIC pump later. Discussed Lactation rounds are available Monday-Friday at 1500, and if needs support with latching, to have NICU RN call to set up feeding appointment. CHRISTIN has no other questions or concerns regarding breastfeeding.

## 2020-04-11 NOTE — CARE PLAN
Problem: Potential for postpartum infection related to surgical incision, compromised uterine condition, urinary tract or respiratory compromise  Goal: Patient will be afebrile and free from signs and symptoms of infection  Outcome: PROGRESSING AS EXPECTED  Note: VSS. No s/s of infection     Problem: Alteration in comfort related to surgical incision and/or after birth pains  Goal: Patient verbalizes acceptable pain level  Outcome: PROGRESSING AS EXPECTED  Note: Denies pain at this time. Encouraged non pain med interventions such as ice packs

## 2020-04-12 PROCEDURE — 770002 HCHG ROOM/CARE - OB PRIVATE (112)

## 2020-04-12 PROCEDURE — A9270 NON-COVERED ITEM OR SERVICE: HCPCS | Performed by: OBSTETRICS & GYNECOLOGY

## 2020-04-12 PROCEDURE — 700112 HCHG RX REV CODE 229: Performed by: OBSTETRICS & GYNECOLOGY

## 2020-04-12 PROCEDURE — 700102 HCHG RX REV CODE 250 W/ 637 OVERRIDE(OP): Performed by: OBSTETRICS & GYNECOLOGY

## 2020-04-12 RX ADMIN — OXYCODONE HYDROCHLORIDE AND ACETAMINOPHEN 1 TABLET: 10; 325 TABLET ORAL at 21:52

## 2020-04-12 RX ADMIN — OXYCODONE HYDROCHLORIDE AND ACETAMINOPHEN 1 TABLET: 10; 325 TABLET ORAL at 08:43

## 2020-04-12 RX ADMIN — SIMETHICONE CHEW TAB 80 MG 80 MG: 80 TABLET ORAL at 20:11

## 2020-04-12 RX ADMIN — OXYCODONE HYDROCHLORIDE AND ACETAMINOPHEN 1 TABLET: 10; 325 TABLET ORAL at 04:16

## 2020-04-12 RX ADMIN — OXYCODONE HYDROCHLORIDE AND ACETAMINOPHEN 1 TABLET: 10; 325 TABLET ORAL at 16:25

## 2020-04-12 RX ADMIN — SIMETHICONE CHEW TAB 80 MG 80 MG: 80 TABLET ORAL at 05:57

## 2020-04-12 RX ADMIN — IBUPROFEN 600 MG: 600 TABLET ORAL at 05:57

## 2020-04-12 RX ADMIN — IBUPROFEN 600 MG: 600 TABLET ORAL at 12:23

## 2020-04-12 RX ADMIN — DOCUSATE SODIUM 100 MG: 100 CAPSULE, LIQUID FILLED ORAL at 12:23

## 2020-04-12 RX ADMIN — OXYCODONE HYDROCHLORIDE AND ACETAMINOPHEN 1 TABLET: 10; 325 TABLET ORAL at 12:23

## 2020-04-12 RX ADMIN — SIMETHICONE CHEW TAB 80 MG 80 MG: 80 TABLET ORAL at 12:23

## 2020-04-12 RX ADMIN — IBUPROFEN 600 MG: 600 TABLET ORAL at 00:04

## 2020-04-12 RX ADMIN — IBUPROFEN 600 MG: 600 TABLET ORAL at 20:10

## 2020-04-12 RX ADMIN — SIMETHICONE CHEW TAB 80 MG 80 MG: 80 TABLET ORAL at 00:04

## 2020-04-12 RX ADMIN — VITAMIN A, VITAMIN C, VITAMIN D-3, VITAMIN E, VITAMIN B-1, VITAMIN B-2, NIACIN, VITAMIN B-6, CALCIUM, IRON, ZINC, COPPER 1 TABLET: 4000; 120; 400; 22; 1.84; 3; 20; 10; 1; 12; 200; 27; 25; 2 TABLET ORAL at 05:57

## 2020-04-12 RX ADMIN — DOCUSATE SODIUM 100 MG: 100 CAPSULE, LIQUID FILLED ORAL at 00:04

## 2020-04-12 NOTE — CARE PLAN
Problem: Alteration in comfort related to surgical incision and/or after birth pains  Goal: Patient is able to ambulate, care for self and infant with acceptable pain level  Outcome: PROGRESSING AS EXPECTED  Note: Pt. Able to ambulate without difficulty and able to care for self performing queta care independently. Infant in NICU and pt. Pumping with electric breast pump and sending expressed milk to NICU.     Problem: Alteration in comfort related to surgical incision and/or after birth pains  Goal: Patient verbalizes acceptable pain level  4/11/2020 2312 by Kelsey C. Koyanagi, R.N.  Outcome: PROGRESSING AS EXPECTED  Note: Pain management discussed with pt Pt. Pain well managed and tolerated with medications given when available. Whiteboard updated with times. Will continue to monitor.

## 2020-04-12 NOTE — PROGRESS NOTES
"Obstetrics and Gynecology  Post- Progress Note    CC: POD2 s/p LTCS and bilateral salpingectomy for previa and desired sterilization.    S: Pt feeling well.  Pain well controlled.  Rachel reg diet.  Has ambulated.  Lochia mild. Voiding w/o difficulty.  +flatus/-BM.  Pt denies CP/SOB, N/V, constipation/diarrhea, lower leg pain.  Pumping for baby in NICU.    O: BP (!) 98/65 Comment: RN notified  Pulse 69   Temp 36.4 °C (97.5 °F) (Temporal)   Resp 16   Ht 1.6 m (5' 3\")   Wt 68 kg (150 lb)   LMP 2019 (Within Days)   SpO2 96%   Breastfeeding No   BMI 26.57 kg/m² , Temp (24hrs), Av.6 °C (97.8 °F), Min:36.4 °C (97.5 °F), Max:36.7 °C (98.1 °F)       Gen: NAD, AAO    CV: RRR    Pulm: unlabored    Abd: Soft, NT, no rebound/guarding, fundus firm at U-2.    Incision: clean, dry, intact, with Mepilex dressing in place, no shadowing.  No erythema, induration or drainage.    Ext: WWP, no edema, RIGHT medial popliteal lymph node is tender to palpation    Recent Labs     04/10/20  0536 04/10/20  1710   WBC 9.4 13.4*   RBC 3.86* 3.49*   HEMOGLOBIN 11.4* 10.3*   HEMATOCRIT 36.5* 32.2*   MCV 94.6 92.3   MCH 29.5 29.5   RDW 53.8* 51.3*   PLATELETCT 264 249   MPV 9.9 9.4       A/P: Yuliana Hernández is a 34 y.o.  s/p LTCS and bilateral salpingectomy for previa and desired sterilization.  AVSS.  Recovering well.    - Continue routine postpartum care.    - Encourage ambulation.    - Continue current pain regimen    Anticipate d/c POD 4 as baby in the NICU    Haseeb Tafoya MD, MS,  2020, 8:40 AM    "

## 2020-04-12 NOTE — LACTATION NOTE
Mother pumping for NICU infant. Breasts are filling with milk. Has large crack on base of left nipple on the lateral side and reports her collected milk was pink tinged from the left breast while pumping last night. She reports she has been using the 25mm flanges because the 30.5mm flanges did not get good suction. Placed valve on larger flange for mother to try as she did not move the valve over when using the larger flange yesterday. Fit assessed using 30.5mm flange, size appears appropriate, mother reports good milk removal and increased comfort. Prefers single pumping. Denies questions/concerns.

## 2020-04-12 NOTE — PROGRESS NOTES
1915 - Received report from ALEJO Collins. Assumed care of pt. Plan of care discussed.    Assessment complete. Fundus firm and palpable, lochia scant rubra. Pain management and interventions discussed with pt. Pt. States that pain is well managed with medications given when available. Whiteboard updated with next available times for PRN medications. Infant in NICU and pt. Pumping Q3 hours and sending expressed milk down to NICU. Will call when milk needs to be delivered. All other questions and concerns discussed at this time. No further needs. Encouraged pt. To call with needs. Will continue to monitor.     0600- MOB breast milk pink. Checked with MOB how nipples feel during pumping. MOB states she did not notice that left side breast developed a blister and experienced some pain during pumping. Educated mom to decrease suction rate at next pump time and gave mom lanolin.     MOB BP 98/65. Per pt., she declines any lightheadedness or dizziness. Pt ambulatory and able to ambulate to restroom independently.

## 2020-04-13 PROCEDURE — A9270 NON-COVERED ITEM OR SERVICE: HCPCS | Performed by: OBSTETRICS & GYNECOLOGY

## 2020-04-13 PROCEDURE — 700102 HCHG RX REV CODE 250 W/ 637 OVERRIDE(OP): Performed by: OBSTETRICS & GYNECOLOGY

## 2020-04-13 PROCEDURE — 700112 HCHG RX REV CODE 229: Performed by: OBSTETRICS & GYNECOLOGY

## 2020-04-13 PROCEDURE — 770002 HCHG ROOM/CARE - OB PRIVATE (112)

## 2020-04-13 RX ADMIN — DOCUSATE SODIUM 100 MG: 100 CAPSULE, LIQUID FILLED ORAL at 05:58

## 2020-04-13 RX ADMIN — OXYCODONE HYDROCHLORIDE AND ACETAMINOPHEN 1 TABLET: 10; 325 TABLET ORAL at 12:07

## 2020-04-13 RX ADMIN — IBUPROFEN 600 MG: 600 TABLET ORAL at 05:58

## 2020-04-13 RX ADMIN — SIMETHICONE CHEW TAB 80 MG 80 MG: 80 TABLET ORAL at 22:37

## 2020-04-13 RX ADMIN — IBUPROFEN 600 MG: 600 TABLET ORAL at 18:36

## 2020-04-13 RX ADMIN — OXYCODONE HYDROCHLORIDE AND ACETAMINOPHEN 1 TABLET: 10; 325 TABLET ORAL at 15:54

## 2020-04-13 RX ADMIN — OXYCODONE HYDROCHLORIDE AND ACETAMINOPHEN 1 TABLET: 10; 325 TABLET ORAL at 07:56

## 2020-04-13 RX ADMIN — SIMETHICONE CHEW TAB 80 MG 80 MG: 80 TABLET ORAL at 15:54

## 2020-04-13 RX ADMIN — OXYCODONE HYDROCHLORIDE AND ACETAMINOPHEN 1 TABLET: 5; 325 TABLET ORAL at 22:37

## 2020-04-13 RX ADMIN — IBUPROFEN 600 MG: 600 TABLET ORAL at 12:07

## 2020-04-13 RX ADMIN — ACETAMINOPHEN 325 MG: 325 TABLET, FILM COATED ORAL at 15:54

## 2020-04-13 RX ADMIN — OXYCODONE HYDROCHLORIDE AND ACETAMINOPHEN 1 TABLET: 10; 325 TABLET ORAL at 02:36

## 2020-04-13 RX ADMIN — VITAMIN A, VITAMIN C, VITAMIN D-3, VITAMIN E, VITAMIN B-1, VITAMIN B-2, NIACIN, VITAMIN B-6, CALCIUM, IRON, ZINC, COPPER 1 TABLET: 4000; 120; 400; 22; 1.84; 3; 20; 10; 1; 12; 200; 27; 25; 2 TABLET ORAL at 05:58

## 2020-04-13 RX ADMIN — SIMETHICONE CHEW TAB 80 MG 80 MG: 80 TABLET ORAL at 07:56

## 2020-04-13 RX ADMIN — OXYCODONE HYDROCHLORIDE AND ACETAMINOPHEN 1 TABLET: 5; 325 TABLET ORAL at 18:36

## 2020-04-13 NOTE — CARE PLAN
Problem: Altered physiologic condition related to postoperative  delivery  Goal: Patient physiologically stable as evidenced by normal lochia, palpable uterine involution and vital signs within normal limits  Outcome: PROGRESSING AS EXPECTED  Note: VSS. Fundus firm. Lochia light.     Problem: Potential knowledge deficit related to lack of understanding of self and  care  Goal: Patient will demonstrate ability to care for self and infant  Outcome: PROGRESSING AS EXPECTED  Note: Pt pain managed with PRN pain meds per MAR.

## 2020-04-13 NOTE — DISCHARGE PLANNING
Discharge Planning Assessment Post Partum    Reason for Referral: NICU  Address: [address] [place] [address] Rd, [facility] [address]  Type of Living Situation: House with FOB and other children   Mom Diagnosis: Pregnancy   Baby Diagnosis: Respiratory distress  Primary Language: English     Name of Baby: Ubaldo   Mother of the Baby: Yuliana Hernández ([phone])  Father of the Baby: Markie Hopper   Involved in baby’s care? Yes  Contact Information: [phone]    Prenatal Care: Yes  Mom's PCP: Reji Christopher  PCP for new baby: Colletti     Support System: Yes  Coping/Bonding between mother & baby: Yes  Source of Feeding: Breast  Supplies for Infant: Prepared    Mom's Insurance: [place] and Medicaid  Baby Covered on Insurance: MOB is still deciding if she wants to add baby to her [place] insurance. Baby is pending Medicaid  Mother Employed/School: Yes, MOB works at [employer]. FOB also works.   Other children in the home/names & ages: MOB has two other children (ages 14 and 7)    Financial Hardship/Income: No  Mom's Mental status: Alert and Oriented x 4  Services used prior to admit: Medicaid, WIC    CPS History: No  Psychiatric History: No  Domestic Violence History: No  Drug/ETOH History: No    Resources Provided: MTM information   Referrals Made: None     Clearance for Discharge: Baby is clear to discharge home with MOB/FOB upon medical clearance.     Ongoing Plan: Continue to provide support and resources to family until dc.

## 2020-04-13 NOTE — LACTATION NOTE
Mother reports improved comfort with pumping, reports her left nipple is healing, is using 30.5mm flanges and removing approximately 2 ounces total per pumping session. She reports infant has latched twice in NICU successfully and she is feeling confident with her progress. She has WIC but desires to rent [employer] pump tomorrow at discharge, information for rental pump reviewed. Mother denies questions/concerns.

## 2020-04-13 NOTE — PROGRESS NOTES
Assessment complete. Fundus firm, lochia light. VSS. Tolerating diet. Voiding without difficulty. Incision dressing CDI. Pt states passing gas. Pain controlled with prn medications per mar. Will offer pain medications as they become available. POC discussed with pt. Encouraged to call with needs. Call light in place.

## 2020-04-13 NOTE — CARE PLAN
Problem: Potential knowledge deficit related to lack of understanding of self and  care  Goal: Patient will verbalize understanding of self and infant care  Outcome: PROGRESSING AS EXPECTED  Note: Incision dressing c/d/I, no s.sx of infection     Problem: Potential anxiety related to difficulty adapting to parental role  Goal: Patient will verbalize and demonstrate effective bonding and parenting behavior  Outcome: PROGRESSING AS EXPECTED  Note: Pt expressing appropriate feelings about baby in NICU with realistic goals

## 2020-04-13 NOTE — PROGRESS NOTES
"Obstetrics and Gynecology  Post- Progress Note    CC: POD3 s/p LTCS and bilateral salpingectomy for previa and desired sterilization.    S: Pt feeling well.  Pain well controlled.  Rachel reg diet.  Has ambulated.  Lochia mild. Voiding w/o difficulty.  +flatus/+BM.  Pt denies CP/SOB, N/V, constipation/diarrhea, lower leg pain.  Pumping for baby in NICU.  Baby doing well.  Planning d/c home tomorrow to stay close to baby.     O: BP (!) 99/65 Comment: RN has been notified.  Pulse 87   Temp 36.2 °C (97.2 °F) (Temporal)   Resp 16   Ht 1.6 m (5' 3\")   Wt 68 kg (150 lb)   LMP 2019 (Within Days)   SpO2 97%   Breastfeeding No   BMI 26.57 kg/m² , Temp (24hrs), Av.6 °C (97.8 °F), Min:36.4 °C (97.5 °F), Max:36.7 °C (98.1 °F)       Gen: NAD, AAO    CV: RRR    Pulm: unlabored    Abd: Soft, NT, no rebound/guarding, fundus firm at U-2.    Incision: clean, dry, intact, with Mepilex dressing in place, no shadowing.  No erythema, induration or drainage.    Ext: WWP, no edema, RIGHT medial popliteal lymph node is less tender to palpation today    Recent Labs     04/10/20  1710   WBC 13.4*   RBC 3.49*   HEMOGLOBIN 10.3*   HEMATOCRIT 32.2*   MCV 92.3   MCH 29.5   RDW 51.3*   PLATELETCT 249   MPV 9.4       A/P: Yuliana Hernández is a 34 y.o.  s/p LTCS and bilateral salpingectomy for previa and desired sterilization.  AVSS.  Recovering well.    - Continue routine postpartum care.    - Encourage ambulation.    - Continue current pain regimen    Anticipate d/c POD 4 (tomorrow) as baby in the NICU    Haseeb Tafoya MD, MS,  2020, 8:40 AM    "

## 2020-04-14 VITALS
BODY MASS INDEX: 26.58 KG/M2 | DIASTOLIC BLOOD PRESSURE: 65 MMHG | OXYGEN SATURATION: 98 % | HEIGHT: 63 IN | HEART RATE: 74 BPM | TEMPERATURE: 98 F | RESPIRATION RATE: 19 BRPM | SYSTOLIC BLOOD PRESSURE: 112 MMHG | WEIGHT: 150 LBS

## 2020-04-14 PROCEDURE — A9270 NON-COVERED ITEM OR SERVICE: HCPCS | Performed by: OBSTETRICS & GYNECOLOGY

## 2020-04-14 PROCEDURE — 700102 HCHG RX REV CODE 250 W/ 637 OVERRIDE(OP): Performed by: OBSTETRICS & GYNECOLOGY

## 2020-04-14 PROCEDURE — 700112 HCHG RX REV CODE 229: Performed by: OBSTETRICS & GYNECOLOGY

## 2020-04-14 RX ORDER — IBUPROFEN 600 MG/1
600 TABLET ORAL EVERY 6 HOURS PRN
Qty: 30 TAB | Refills: 1 | Status: SHIPPED | OUTPATIENT
Start: 2020-04-14

## 2020-04-14 RX ORDER — OXYCODONE HYDROCHLORIDE AND ACETAMINOPHEN 5; 325 MG/1; MG/1
1-2 TABLET ORAL EVERY 4 HOURS PRN
Qty: 30 TAB | Refills: 0 | Status: SHIPPED | OUTPATIENT
Start: 2020-04-14 | End: 2020-04-21

## 2020-04-14 RX ADMIN — IBUPROFEN 600 MG: 600 TABLET ORAL at 03:25

## 2020-04-14 RX ADMIN — SIMETHICONE CHEW TAB 80 MG 80 MG: 80 TABLET ORAL at 06:47

## 2020-04-14 RX ADMIN — OXYCODONE HYDROCHLORIDE AND ACETAMINOPHEN 1 TABLET: 5; 325 TABLET ORAL at 12:51

## 2020-04-14 RX ADMIN — OXYCODONE HYDROCHLORIDE AND ACETAMINOPHEN 1 TABLET: 5; 325 TABLET ORAL at 03:25

## 2020-04-14 RX ADMIN — DOCUSATE SODIUM 100 MG: 100 CAPSULE, LIQUID FILLED ORAL at 06:47

## 2020-04-14 RX ADMIN — VITAMIN A, VITAMIN C, VITAMIN D-3, VITAMIN E, VITAMIN B-1, VITAMIN B-2, NIACIN, VITAMIN B-6, CALCIUM, IRON, ZINC, COPPER 1 TABLET: 4000; 120; 400; 22; 1.84; 3; 20; 10; 1; 12; 200; 27; 25; 2 TABLET ORAL at 06:46

## 2020-04-14 RX ADMIN — IBUPROFEN 600 MG: 600 TABLET ORAL at 10:43

## 2020-04-14 RX ADMIN — OXYCODONE HYDROCHLORIDE AND ACETAMINOPHEN 1 TABLET: 5; 325 TABLET ORAL at 07:21

## 2020-04-14 NOTE — PROGRESS NOTES
Pt assessment done, pain tolerable at this time. POC discussed, pt will request pain medication as needed.  Pt ambulating to ICN for care times.  Pump settings, frequency and duration discussed, no questions at this time.

## 2020-04-14 NOTE — CARE PLAN
Problem: Altered physiologic condition related to postoperative  delivery  Goal: Patient physiologically stable as evidenced by normal lochia, palpable uterine involution and vital signs within normal limits  Outcome: PROGRESSING AS EXPECTED  Note: Fundus firm, lochia light,blood pressure and other vitals stable. Patient able to ambulate without dizziness. Patient intake of fluids wnl. Post delivery hematocrit and hemoglobin  is stable.      Problem: Alteration in comfort related to surgical incision and/or after birth pains  Goal: Patient verbalizes acceptable pain level  Outcome: PROGRESSING AS EXPECTED  Note: Pain level rechecked within 1 hour and patient states pain medicine is effective with pain control.patient demonstrates improved ease of movement patient caring for baby and self with good skill.

## 2020-04-14 NOTE — PROGRESS NOTES
Assessment done. Pt.complains of pain at 6 , and patient medicated. Incision covered with mepilex dressing without drainage.Lochia scant to light. Pt ambulating in room and to nicu without dizziness or assistance.Pt. Passing flatus, no problems urinating.patient planning discharge today after care conference at 2:30 pm.

## 2020-04-14 NOTE — DISCHARGE INSTRUCTIONS
POSTPARTUM DISCHARGE INSTRUCTIONS FOR MOM    YOB: 1985   Age: 34 y.o.               Admit Date: 3/26/2020     Discharge Date: 2020  Attending Doctor:  Seth Morrissey M.D.                  Allergies:  Patient has no known allergies.    Discharged to home by car. Discharged via wheelchair, hospital escort: Yes.  Special equipment needed: Not Applicable  Belongings with: Personal  Be sure to schedule a follow-up appointment with your primary care doctor or any specialists as instructed.     Discharge Plan:   Diet Plan: Discussed  Activity Level: Discussed  Confirmed Follow up Appointment: Patient to Call and Schedule Appointment  Confirmed Symptoms Management: Discussed  Influenza Vaccine Indication: Patient Refuses    REASONS TO CALL YOUR OBSTETRICIAN:  1.   Persistent fever or shaking chills (Temperature higher than 100.4)  2.   Heavy bleeding (soaking more than 1 pad per hour); Passing clots  3.   Foul odor from vagina  4.   Mastitis (Breast infection; breast pain, chills, fever, redness)  5.   Urinary pain, burning or frequency  6.   Episiotomy infection  7.   Abdominal incision infection  8.   Severe depression longer than 24 hours    HAND WASHING  · Prior to handling the baby.  · Before breastfeeding or bottle feeding baby.  · After using the bathroom or changing the baby's diaper.    WOUND CARE  Ask your physician for additional care instructions.  In general:    ·  Incision:      · Keep clean and dry.    · Do NOT lift anything heavier than your baby for up to 6 weeks.    · There should not be any opening or pus.      VAGINAL CARE  · Nothing inside vagina for 6 weeks: no sexual intercourse, tampons or douching.  · Bleeding may continue for 2-4 weeks.  Amount may vary.    · Call your physician for heavy bleeding which means soaking more than 1 pad per hour    BIRTH CONTROL  · It is possible to become pregnant at any time after delivery and while breastfeeding.  · Plan to discuss a method  "of birth control with your physician at your follow up visit. visit.    DIET AND ELIMINATION  · Eating more fiber (bran cereal, fruits, and vegetables) and drinking plenty of fluids will help to avoid constipation.  · Urinary frequency after childbirth is normal.    POSTPARTUM BLUES  During the first few days after birth, you may experience a sense of the \"blues\" which may include impatience, irritability or even crying.  These feeling come and go quickly.  However, as many as 1 in 10 women experience emotional symptoms known as postpartum depression.    Postpartum depression:  May start as early as the second or third day after delivery or take several weeks or months to develop.  Symptoms of \"blues\" are present, but are more intense:  Crying spells; loss of appetite; feelings of hopelessness or loss of control; fear of touching the baby; over concern or no concern at all about the baby; little or no concern about your own appearance/caring for yourself; and/or inability to sleep or excessive sleeping.  Contact your physician if you are experiencing any of these symptoms.    Crisis Hotline:  · [place] Crisis Hotline:  9-720-ZSLTZWR  Or 1-351.605.2936  · [place] Crisis Hotline:  1-445.793.9014  Or 856-478-8144    PREVENTING SHAKEN BABY:  If you are angry or stressed, PUT THE BABY IN THE CRIB, step away, take some deep breaths, and wait until you are calm to care for the baby.  DO NOT SHAKE THE BABY.  You are not alone, call a supporter for help.    · Crisis Call Center 24/7 crisis line 691-350-4472 or 1-331.181.5904  · You can also text them, text \"ANSWER\" to 113586    QUIT SMOKING/TOBACCO USE:  I understand the use of any tobacco products increases my chance of suffering from future heart disease and could cause other illnesses which may shorten my life. Quitting the use of tobacco products is the single most important thing I can do to improve my health. For further information on smoking / tobacco cessation call " a Toll Free Quit Line at 1-105.187.9798 (*[employer]) or 1-962.988.5370 (American Lung Association) or you can access the web based program at www.lungusa.org.    · [place] Tobacco Users Help Line:  (510) 830-5541       Toll Free: 1-250.880.3672  · Quit Tobacco Program [facility] (747)109-0620    DEPRESSION / SUICIDE RISK:  As you are discharged from this [facility], it is important to learn how to keep safe from harming yourself.    Recognize the warning signs:  · Abrupt changes in personality, positive or negative- including increase in energy   · Giving away possessions  · Change in eating patterns- significant weight changes-  positive or negative  · Change in sleeping patterns- unable to sleep or sleeping all the time   · Unwillingness or inability to communicate  · Depression  · Unusual sadness, discouragement and loneliness  · Talk of wanting to die  · Neglect of personal appearance   · Rebelliousness- reckless behavior  · Withdrawal from people/activities they love  · Confusion- inability to concentrate     If you or a loved one observes any of these behaviors or has concerns about self-harm, here's what you can do:  · Talk about it- your feelings and reasons for harming yourself  · Remove any means that you might use to hurt yourself (examples: pills, rope, extension cords, firearm)  · Get professional help from the community (Mental Health, Substance Abuse, psychological counseling)  · Do not be alone:Call your Safe Contact- someone whom you trust who will be there for you.  · Call your local CRISIS HOTLINE 115-3982 or 165-518-5730  · Call your local Children's Mobile Crisis Response Team [place] (012) 839-3912 or www.[employer]  · Call the toll free National Suicide Prevention Hotlines   · National Suicide Prevention Lifeline 710-827-HZVV (9961)  · National Hope Line Network 800-SUICIDE (407-0895)    DISCHARGE SURVEY:  Thank you for choosing  [facility].  We hope we provided you with very good care.  You may be receiving a survey in the mail.  Please fill it out.  Your opinion is valuable to us.    ADDITIONAL EDUCATIONAL MATERIALS GIVEN TO PATIENT:        My signature on this form indicates that:  1.  I have reviewed and understand the above information  2.  My questions regarding this information have been answered to my satisfaction.  3.  I have formulated a plan with my discharge nurse to obtain my prescribed medication for home.

## 2020-04-14 NOTE — DISCHARGE SUMMARY
Discharge Summary:      Yuliana Hernández    Admit Date:   3/26/2020  Discharge Date:  2020     Admitting diagnosis:  Pregnancy  UNDESIRED FERTILITY, PLACENTA PREVIA, 33 WEEKS  Pregnancy with third trimester bleeding, antepartum  Discharge Diagnosis: Status post  for placenta previa.  Pregnancy Complications: placenta previa  Tubal Ligation:  yes        History:  Past Medical History:   Diagnosis Date   • Asthma      OB History    Para Term  AB Living   3 3   1   3   SAB TAB Ectopic Molar Multiple Live Births           0 3      # Outcome Date GA Lbr Oliver/2nd Weight Sex Delivery Anes PTL Lv   3  04/10/20 36w2d  2.95 kg (6 lb 8.1 oz) M CS-LTranv Spinal N SARBJIT      Complications: Placenta Previa   2 Para            1 Para                 Patient has no known allergies.  Patient Active Problem List    Diagnosis Date Noted   • Placenta previa 04/10/2020   • Asthma exacerbation 2017   • Stuffy nose 09/15/2016   • Ear discomfort 09/15/2016   • Lower urinary tract infectious disease 2015   • Uncomplicated asthma 2015        Hospital Course:   34 y.o. , now para 3, was admitted with the above mentioned diagnosis, underwent Primary  placenta previa, . Patient postpartum course was unremarkable, with progressive advancement in diet , ambulation and toleration of oral analgesia. Patient without complaints today and desires discharge.      Vitals:    20 0600 20 0537 20 1800 20 0555   BP: (!) 98/65 (!) 99/65 105/66 112/65   Pulse: 69 87 75 74   Resp: 16 16 17 19   Temp: 36.4 °C (97.5 °F) 36.2 °C (97.2 °F) 36.5 °C (97.7 °F) 36.7 °C (98 °F)   TempSrc: Temporal Temporal Temporal Temporal   SpO2: 96% 97% 97% 98%   Weight:       Height:           Current Facility-Administered Medications   Medication Dose   • ibuprofen (MOTRIN) tablet 600 mg  600 mg   • acetaminophen (TYLENOL) tablet 325 mg  325 mg   • oxyCODONE-acetaminophen (PERCOCET)  5-325 MG per tablet 1 Tab  1 Tab   • oxyCODONE-acetaminophen (PERCOCET-10)  MG per tablet 1 Tab  1 Tab   • morphine (pf) 4 MG/ML injection 4 mg  4 mg   • ondansetron (ZOFRAN) syringe/vial injection 4 mg  4 mg    Or   • ondansetron (ZOFRAN ODT) dispertab 4 mg  4 mg   • LR infusion     • miSOPROStol (CYTOTEC) tablet 600 mcg  600 mcg   • docusate sodium (COLACE) capsule 100 mg  100 mg   • simethicone (MYLICON) chewable tab 80 mg  80 mg   • prenatal plus vitamin (STUARTNATAL 1+1) 27-1 MG tablet 1 Tab  1 Tab   • LR infusion     • oxytocin (PITOCIN) infusion (for postpartum)   mL/hr       Exam:  Breast Exam: Inspection negative. No nipple discharge or bleeding. No masses or nodularity palpable  Abdomen: Abdomen soft, non-tender. BS normal. No masses,  No organomegaly  Fundus Non Tender: yes  Incision: dry and intact  Perineum: perineum intact  Extremity: extremities, peripheral pulses and reflexes normal     Labs:         Activity:   Discharge to home  Pelvic Rest x 6 weeks    Assessment:  normal postpartum course  Discharge Assessment: No areas of skin breakdown/redness; surgical incision intact/healing     [facility]: [facility] in 5 weeks for vaginal ; 1 week for incision check.      Discharge Meds:   Current Outpatient Medications   Medication Sig Dispense Refill   • ibuprofen (MOTRIN) 600 MG Tab Take 1 Tab by mouth every 6 hours as needed (For cramping after delivery; do not give if patient is receiving ketorolac (Toradol)). 30 Tab 1   • oxyCODONE-acetaminophen (PERCOCET) 5-325 MG Tab Take 1-2 Tabs by mouth every four hours as needed for up to 7 days. 30 Tab 0       Jyoti Lamas M.D.

## 2020-04-14 NOTE — LACTATION NOTE
This note was copied from a baby's chart.  Evaluated pump use to include frequency, duration, suction and speed settings.Progression to breastfeeding discussed with mother.     Outlined the supportive measures that should be in place for now, to include pumping strategies, hand expression and intrinsic factors (smell, touch, sight and visualization).     Encouraged parents to utilize Lactation services as need during baby's hospitalization, discussed protocol for that.    She is planning to obtain her [facility] breast pump today at [facility], reviewed details for that.

## 2020-04-14 NOTE — PROGRESS NOTES
Discharge instruction discussed. Prescription given and explained. She would like to go home after 1430, primary RN is aware.

## (undated) DEVICE — TUBING CLEARLINK DUO-VENT - C-FLO (48EA/CA)

## (undated) DEVICE — DETERGENT RENUZYME PLUS 10 OZ PACKET (50/BX)

## (undated) DEVICE — SODIUM CHL IRRIGATION 0.9% 1000ML (12EA/CA)

## (undated) DEVICE — TRAY SPINAL ANESTHESIA NON-SAFETY (10/CA)

## (undated) DEVICE — SUTURE 0 CHROMIC CT-1 - (36/BX)

## (undated) DEVICE — ELECTRODE DUAL RETURN W/ CORD - (50/PK)

## (undated) DEVICE — SET EXTENSION WITH 2 PORTS (48EA/CA) ***PART #2C8610 IS A SUBSTITUTE*****

## (undated) DEVICE — SLEEVE, SEQUENTIAL CALF REG

## (undated) DEVICE — HEAD HOLDER JUNIOR/ADULT

## (undated) DEVICE — KIT  I.V. START (100EA/CA)

## (undated) DEVICE — TAPE CLOTH MEDIPORE 6 INCH - (12RL/CA)

## (undated) DEVICE — PACK C-SECTION (2EA/CA)

## (undated) DEVICE — SUTURE 0 36 CHROMIC GUT CTX (36PK/BX)

## (undated) DEVICE — CANISTER SUCTION 3000ML MECHANICAL FILTER AUTO SHUTOFF MEDI-VAC NONSTERILE LF DISP  (40EA/CA)

## (undated) DEVICE — SUTURE 0 VICRYL PLUS CT-1 - 36 INCH (36/BX)

## (undated) DEVICE — CATHETER IV NON-SAFETY 18 GA X 1 1/4 (50/BX 4BX/CA)

## (undated) DEVICE — SUTURE 2-0 VICRYL PLUS CT-1 36 (36PK/BX)"

## (undated) DEVICE — WATER IRRIG. STER. 1500 ML - (9/CA)

## (undated) DEVICE — SUTURE3-0 36IN VCRLY PLS ANTI (36PK/BX)

## (undated) DEVICE — TUBE SUCTION YANKAUER  1/4 X 6FT (20EA/CA)"

## (undated) DEVICE — CHLORAPREP 26 ML APPLICATOR - ORANGE TINT(25/CA)

## (undated) DEVICE — GLOVE BIOGEL SZ 8 SURGICAL PF LTX - (50PR/BX 4BX/CA)